# Patient Record
Sex: FEMALE | Race: WHITE | URBAN - METROPOLITAN AREA
[De-identification: names, ages, dates, MRNs, and addresses within clinical notes are randomized per-mention and may not be internally consistent; named-entity substitution may affect disease eponyms.]

---

## 2018-06-12 ENCOUNTER — APPOINTMENT (RX ONLY)
Dept: URBAN - METROPOLITAN AREA CLINIC 31 | Facility: CLINIC | Age: 38
Setting detail: DERMATOLOGY
End: 2018-06-12

## 2018-06-12 DIAGNOSIS — L29.89 OTHER PRURITUS: ICD-10-CM

## 2018-06-12 DIAGNOSIS — L29.8 OTHER PRURITUS: ICD-10-CM

## 2018-06-12 DIAGNOSIS — L30.4 ERYTHEMA INTERTRIGO: ICD-10-CM

## 2018-06-12 DIAGNOSIS — L259 CONTACT DERMATITIS AND OTHER ECZEMA, UNSPECIFIED CAUSE: ICD-10-CM

## 2018-06-12 PROBLEM — L23.9 ALLERGIC CONTACT DERMATITIS, UNSPECIFIED CAUSE: Status: ACTIVE | Noted: 2018-06-12

## 2018-06-12 PROBLEM — L30.9 DERMATITIS, UNSPECIFIED: Status: ACTIVE | Noted: 2018-06-12

## 2018-06-12 PROCEDURE — 99203 OFFICE O/P NEW LOW 30 MIN: CPT | Mod: 25

## 2018-06-12 PROCEDURE — ? BIOPSY BY PUNCH METHOD

## 2018-06-12 PROCEDURE — ? PRESCRIPTION

## 2018-06-12 PROCEDURE — 11100: CPT

## 2018-06-12 PROCEDURE — ? COUNSELING

## 2018-06-12 RX ORDER — KETOCONAZOLE 20 MG/G
CREAM TOPICAL
Qty: 1 | Refills: 2 | Status: ERX | COMMUNITY
Start: 2018-06-12

## 2018-06-12 RX ORDER — PREDNISONE 10 MG/1
TABLET ORAL
Qty: 60 | Refills: 0 | Status: ERX | COMMUNITY
Start: 2018-06-12

## 2018-06-12 RX ORDER — TRIAMCINOLONE ACETONIDE 1 MG/G
CREAM TOPICAL
Qty: 1 | Refills: 0 | Status: ERX | COMMUNITY
Start: 2018-06-12

## 2018-06-12 RX ADMIN — KETOCONAZOLE: 20 CREAM TOPICAL at 18:11

## 2018-06-12 RX ADMIN — TRIAMCINOLONE ACETONIDE: 1 CREAM TOPICAL at 18:24

## 2018-06-12 RX ADMIN — PREDNISONE: 10 TABLET ORAL at 18:17

## 2018-06-12 ASSESSMENT — LOCATION ZONE DERM
LOCATION ZONE: AXILLAE
LOCATION ZONE: ARM

## 2018-06-12 ASSESSMENT — LOCATION DETAILED DESCRIPTION DERM
LOCATION DETAILED: LEFT AXILLARY VAULT
LOCATION DETAILED: RIGHT ANTERIOR PROXIMAL UPPER ARM

## 2018-06-12 ASSESSMENT — LOCATION SIMPLE DESCRIPTION DERM
LOCATION SIMPLE: RIGHT UPPER ARM
LOCATION SIMPLE: LEFT AXILLARY VAULT

## 2018-06-12 NOTE — PROCEDURE: BIOPSY BY PUNCH METHOD
Lab: -20
Dressing: bandage
Additional Anesthesia Volume In Cc (Will Not Render If 0): 0
Home Suture Removal Text: If they have any questions or difficulties they will call the office.
Bill 11263 For Specimen Handling/Conveyance To Laboratory?: no
Wound Care: Petrolatum
Suture Removal: 14 days
Notification Instructions: Patient will be notified of biopsy results if further treatment is necessary. However, patient instructed to call the office if not contacted within 2 weeks.
Hemostasis: Aluminum Chloride
Biopsy Type: H and E
Epidermal Sutures: 4-0 Nylon
Punch Size In Mm: 4
Was A Bandage Applied: Yes
Lab Facility: 3
Detail Level: Detailed
Anesthesia Volume In Cc (Will Not Render If 0): 0.5
Anesthesia Type: 1% lidocaine with epinephrine
Billing Type: Third-Party Bill
Consent: Written consent was obtained and risks were reviewed including but not limited to scarring, infection, bleeding, scabbing, incomplete removal, nerve damage and allergy to anesthesia.
Post-Care Instructions: I reviewed with the patient in detail post-care instructions. Patient is to keep the biopsy covered and then apply petrolatum twice daily until healed.

## 2018-06-12 NOTE — HPI: RASH
How Severe Is Your Rash?: moderate
Is This A New Presentation, Or A Follow-Up?: Rash
Additional History: Patient was prescribed Doxycycline for an upper respiratory infection. Patient broke out in a rash within a day of taking the antibiotic. PCP believes this may be an allergic reaction or a yeast infection.

## 2022-12-16 ENCOUNTER — TELEPHONE (OUTPATIENT)
Dept: PSYCHIATRY | Facility: CLINIC | Age: 42
End: 2022-12-16
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
## 2022-12-28 ENCOUNTER — TELEMEDICINE (OUTPATIENT)
Dept: PSYCHIATRY | Facility: CLINIC | Age: 42
End: 2022-12-28

## 2022-12-28 DIAGNOSIS — F43.21 ADJUSTMENT DISORDER WITH DEPRESSED MOOD: Primary | ICD-10-CM
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
## 2023-01-05 ENCOUNTER — TELEMEDICINE (OUTPATIENT)
Dept: PSYCHIATRY | Facility: CLINIC | Age: 43
End: 2023-01-05

## 2023-01-05 DIAGNOSIS — F43.21 ADJUSTMENT DISORDER WITH DEPRESSED MOOD: Primary | ICD-10-CM

## 2023-01-05 NOTE — PSYCH
Virtual Regular Visit    Verification of patient location:    Patient is located in the following state in which I hold an active license [place]      Assessment/Plan:    Problem List Items Addressed This Visit    None      Goals addressed in session: Goal 1          Reason for visit is No chief complaint on file  Encounter provider Luma Medina    Provider located at [address] [place] [address] Benny Eaton [facility] [id number] [phone]      Recent Visits  No visits were found meeting these conditions  Showing recent visits within past 7 days and meeting all other requirements  Today's Visits  Date Type Provider Dept   01/05/23 Telemedicine [facility] [place] [place] [facility]   Showing today's visits and meeting all other requirements  Future Appointments  No visits were found meeting these conditions  Showing future appointments within next 150 days and meeting all other requirements       The patient was identified by name and date of birth  Gini Luo was informed that this is a telemedicine visit and that the visit is being conducted throughthe [employer] Navigation platform  She agrees to proceed     My office door was closed  No one else was in the room  She acknowledged consent and understanding of privacy and security of the video platform  The patient has agreed to participate and understands they can discontinue the visit at any time  Patient is aware this is a billable service  Subjective  Gini Luo is a 40 y o  female Ashley Frankel attended a Individual  session today  Ashley Frankel entered session with Sad mood and mood-congruent affect  Ashley Frankel expresses sadness as she has come to the realization that her current relationship has been codependent and it will not be what she would like it to be   Client has broken up with this person and is continuing through with the divorce with her --identfying guilt as a constant in regard to his depression and her feeling responsible for it  Writer provided feedback , explored emotions and processed and provided opportunity to reflect on decisions  Writer utilized an eclectic therapeutic approach including interpersonal therapy, expressive therapy and person-centered  Clinician explored client's attachment, discussing the need to heal from this loss of her relationship  Clinician explained the difference of avoidance and distraction  A: Minnie Hsu presented as cooperative, calm  Deabety Drabyer appears to be in the Preparation stage of change  Deabety Hsu has Fair insight  Skye's speech was appropriate quality, quantity and organization of sentences  Skye appeared appropriate  P: Minnie Hsu will follow up with Ashley Hamilton LCSW  in 1 week  In the interim, Minnie Hsu will be mindful of any need for boundaries  Goal(s) 1 was/were addressed in session  Mental Pain:  Very Mild    Skye denies SI, SH, HI at this time and can contract for safety  Behavioral Health Treatment Plan [facility]: Diagnosis and Treatment Plan explained to Yolisrl Wilnerer  Minnie Darbyer relates understanding diagnosis and is agreeable to Treatment Plan  Yes      HPI     No past medical history on file  No past surgical history on file  No current outpatient medications on file  No current facility-administered medications for this visit  Not on File    Review of Systems    Video Exam    There were no vitals filed for this visit      Physical Exam     Visit Time    01/05/23  Start Time: 1104  Stop Time: 8880  Total Visit Time: 48 minutes

## 2023-01-05 NOTE — BH TREATMENT PLAN
Brody Aliciarula  1980       Date of Initial Treatment Plan: 1/5/23   Date of Current Treatment Plan: 01/06/23    Treatment Plan Number 1     Strengths/Personal Resources for Self Care: Client tries to be self aware  "I am reliable and strong willed  I am determined "      Diagnosis:   1  Adjustment disorder with depressed mood            Area of Needs: Address guilt regarding divorce--- with adela and children's transition; client also identifies an anxious attachment style and wants to make sure to become involved in a secure attachment  Long Term Goal 1: Gerson wants to make peace with the divorce  Target Date: 6/5/23  Completion Date: 1/5/24         Short Term Objectives for Goal 1: ACldar will recognize at least 3 differences within her single mother home; Client will develop a routine to adapt to the loss of a  and a gain of a coparent; Client will go through the 5 stages of grief-- identifying these and processing each stage; client will allow herself to feel her feelings 5/5x; Client will develop at least 5 coping mechanisms; client will learn the difference between avoidance/distraction; Client will acknowledge what is true, what she has control over and accept what she does not; Client will identify at least 5 gains from the divorce and will identify at least 3dreams and goals for the future   Client will identify and establish 5 boundaries for her ex     Long Term Goal 2: Client will develop a secure attachment style    Target Date: 6/6/2023  Completion Date: 1/5/2024    Short Term Objectives for Goal 2: Gerson will learn the attachment styles; client will identify her attachment style and 3 ways it impacts her relationships; clien will exercise the opposite of her insecure behaviors 5/5x; client will increase her emotional awaareness--identifying and tolerating her emotions 5/5x and showing empathy toward her partner; Client will practice vulnerability, open communication, and listening; client will seek out healthy relationships--identifying at least 5 values of her own  Client will learn to identify her emotional/physical needs-- and will practice expressing these verbally and physically  ; Client will process her fear of being alone-- client will identify what brings her comfort and peace; Client will identify at least 2 places that bring her peace, identifying at least 3 hobbies that she can do independently at least once a week; client will engage in self care at least once a week; Client  will practice placing her needs as a priority at least once a week; Long Term Goal # 3: N/A     Target Date: N/A  Completion Date: N/A    Short Term Objectives for Goal 3: N/A    GOAL 1: Modality: Individual 4x per month   Completion Date 6/5/2023    GOAL 2: Modality: Individual 4x per month   Completion Date 6/5/2023     GOAL 3: Modality: Individual 4x per month   Completion Date 6/5/23      Behavioral Health Treatment Plan [facility]: Diagnosis and Treatment Plan explained to Elsa Donovan relates understanding diagnosis and is agreeable to Treatment Plan            Client Comments : Please share your thoughts, feelings, need and/or experiences regarding your treatment plan: Client feels that this is a good starting place

## 2023-01-12 ENCOUNTER — TELEMEDICINE (OUTPATIENT)
Dept: PSYCHIATRY | Facility: CLINIC | Age: 43
End: 2023-01-12

## 2023-01-12 DIAGNOSIS — F43.21 ADJUSTMENT DISORDER WITH DEPRESSED MOOD: Primary | ICD-10-CM

## 2023-01-13 PROBLEM — F43.21 ADJUSTMENT DISORDER WITH DEPRESSED MOOD: Status: ACTIVE | Noted: 2023-01-13

## 2023-01-13 NOTE — PSYCH
Virtual Regular Visit    Verification of patient location:    Patient is located in the following state in which I hold an active license [place]      Assessment/Plan:    Problem List Items Addressed This Visit    None      Goals addressed in session: Goal 1          Reason for visit is No chief complaint on file  Encounter provider Luma Hope    Provider located at [address] [place] [id number]  [phone]      Recent Visits  Date Type Provider Dept   01/05/23 Telemedicine [facility] [place] [place] [facility]   Showing recent visits within past 7 days and meeting all other requirements  Today's Visits  Date Type Provider Dept   01/12/23 Telemedicine [facility] [place] [place] [facility]   Showing today's visits and meeting all other requirements  Future Appointments  No visits were found meeting these conditions  Showing future appointments within next 150 days and meeting all other requirements       The patient was identified by name and date of birth  Mehul Ramirez was informed that this is a telemedicine visit and that the visit is being conducted throughthe [employer] platform  She agrees to proceed     My office door was closed  No one else was in the room  She acknowledged consent and understanding of privacy and security of the video platform  The patient has agreed to participate and understands they can discontinue the visit at any time  Patient is aware this is a billable service  Subjective  Mehul Ramirez is a 40 y o  female Fabio Clay attended a Individual  session today  Fabio Clay entered session with Angry mood and mood-congruent affect  Fabio Clay expresses feelings of anger, resentment, and guilt as she recognizes that she has the most of custody for her children   Client contacted her mother and her mother described her as being selfish and advised her to put her children first  Client is now doubting if she is considering her children or if she is being selfish for wanting a break  Writer provided empathic responses, explored emotions and processed, initiated grief discussion, provided opportunity to reflect on decisions and explored cognitive schemas  Writer utilized an eclectic therapeutic approach including expressive therapy, person-centered and psycho-ed  Clinician discussed the grief stages and acknowledged the stages as she moves through them and accepts a new way of living  Clinician and client identified a trigger decision she had made when she initially filed for divorce and how she could transition and give him more responsibility in the future  Clinician and client assessed client's needs and clinician educated client on the results of a lack of sleep and the risk of care giver burn out-- equating her needs to her children's needs  Clinician assisted client in recognizing which emotions she takes on from her support system and which emotions are actually hers  A: Susie Taylor presented as cooperative, calm  Susie Taylor appears to be in the Action stage of change  Susie Taylor has Improving insight  Skye's speech was appropriate quality, quantity and organization of sentences  Skye appeared appropriate  P: Susie Taylor will follow up with Aslhey Hamilton LCSW  in 1 week  In the interim, Susie Taylor will practice validating her emotions as she is processing this transition  Goal(s) 1 was/were addressed in session  Mental Pain:  Very Mild    Skye denies SI, SH, HI at this time and can contract for safety  Behavioral Health Treatment Plan [facility]: Diagnosis and Treatment Plan explained to Susie Richards Caro relates understanding diagnosis and is agreeable to Treatment Plan  Yes      HPI     No past medical history on file  No past surgical history on file  No current outpatient medications on file       No current facility-administered medications for this visit  Not on File    Review of Systems    Video Exam    There were no vitals filed for this visit      Physical Exam     Visit Time    01/12/23  Start Time: 0236  Stop Time: 2085  Total Visit Time: 49 minutes

## 2023-01-19 ENCOUNTER — TELEMEDICINE (OUTPATIENT)
Dept: PSYCHIATRY | Facility: CLINIC | Age: 43
End: 2023-01-19

## 2023-01-19 DIAGNOSIS — F43.21 ADJUSTMENT DISORDER WITH DEPRESSED MOOD: Primary | ICD-10-CM
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
## 2023-01-25 ENCOUNTER — TELEMEDICINE (OUTPATIENT)
Dept: PSYCHIATRY | Facility: CLINIC | Age: 43
End: 2023-01-25

## 2023-01-25 DIAGNOSIS — F43.21 ADJUSTMENT DISORDER WITH DEPRESSED MOOD: Primary | ICD-10-CM

## 2023-01-26 NOTE — PSYCH
Virtual Regular Visit    Verification of patient location:    Patient is located in the following state in which I hold an active license [place]      Assessment/Plan:    Problem List Items Addressed This Visit        Psychiatry Problems    Adjustment disorder with depressed mood - Primary       Goals addressed in session: Goal 1          Reason for visit is No chief complaint on file  Encounter provider Luma Hooper    Provider located at [address] [address]  [phone]      Recent Visits  Date Type Provider Dept   01/25/23 Telemedicine [address] [place] [address] [facility]   01/19/23 Telemedicine [place] [place] [facility]   Showing recent visits within past 7 days and meeting all other requirements  Future Appointments  No visits were found meeting these conditions  Showing future appointments within next 150 days and meeting all other requirements       The patient was identified by name and date of birth  Phil Lemus was informed that this is a telemedicine visit and that the visit is being conducted throughthe [employer] platform  She agrees to proceed     My office door was closed  No one else was in the room  She acknowledged consent and understanding of privacy and security of the video platform  The patient has agreed to participate and understands they can discontinue the visit at any time  Patient is aware this is a billable service  Subjective  Phil Lemus is a 40 y o  female Behavioral Health Psychotherapy Progress Note    Psychotherapy Provided: Individual Psychotherapy     1  Adjustment disorder with depressed mood            Goals addressed in session: Goal 1     DATA: Clinician explored client's thoughts a feelings regarding this past week   Clinician discussed the stages of grief-- how depression can sneak in and take over one day, and feel fine the next  Clinician normalized the difficulty of adapting to being a single mother and assisted client in recognizing the new opportunities available with her new found time during the week  Clinician asserted the fact that client is responsible for her children's basic need  During this session, this clinician used the following therapeutic modalities: Bereavement Therapy, Client-centered Therapy and Supportive Psychotherapy    Substance Abuse was not addressed during this session  If the client is diagnosed with a co-occurring substance use disorder, please indicate any changes in the frequency or amount of use: NA  Stage of change for addressing substance use diagnoses: No substance use/Not applicable    ASSESSMENT:  Laura Chand presents with a Depressed mood  her affect is Normal range and intensity and Tearful, which is congruent, with her mood and the content of the session  The client has made progress on their goals  Client reports that she has been setting boundaries but has been having trouble with being a lone  Client identifies the fear of being alone and feels that there is a lot of free time that she has--and is worried about fighting the feeling to lay down and do nothing  Larua Chand presents with a none risk of suicide, none risk of self-harm, and none risk of harm to others  For any risk assessment that surpasses a "low" rating, a safety plan must be developed  A safety plan was indicated: no  If yes, describe in detail NA    PLAN: Between sessions, Laura Chand will develop a routine day to day including self care, time with her children, work, chores, and socialization  At the next session, the therapist will use Bereavement Therapy, Client-centered Therapy, Solution-Focused Therapy and Supportive Psychotherapy to address client's ongoing difficulties with the divorce       Behavioral Health Treatment Plan and Discharge Planning: Laura Chand is aware of and agrees to continue to work on their treatment plan  They have identified and are working toward their discharge goals  yes    Visit start and stop times:    01/25/23  Start Time: 0903  Stop Time: 3862  Total Visit Time: 50 minutes   HPI     No past medical history on file  No past surgical history on file  No current outpatient medications on file  No current facility-administered medications for this visit  Not on File    Review of Systems    Video Exam    There were no vitals filed for this visit      Physical Exam

## 2023-02-02 ENCOUNTER — TELEMEDICINE (OUTPATIENT)
Dept: PSYCHIATRY | Facility: CLINIC | Age: 43
End: 2023-02-02

## 2023-02-02 DIAGNOSIS — F43.21 ADJUSTMENT DISORDER WITH DEPRESSED MOOD: Primary | ICD-10-CM

## 2023-02-03 NOTE — PSYCH
Virtual Regular Visit    Verification of patient location:    Patient is located in the following state in which I hold an active license [place]      Assessment/Plan:    Problem List Items Addressed This Visit        Psychiatry Problems    Adjustment disorder with depressed mood - Primary       Goals addressed in session: Goal 2          Reason for visit is No chief complaint on file  Encounter provider Luma Armstrong    Provider located at [address] [id number] [phone]      Recent Visits  Date Type Provider Dept   02/02/23 Telemedicine [place] [place] [facility]   Showing recent visits within past 7 days and meeting all other requirements  Future Appointments  No visits were found meeting these conditions  Showing future appointments within next 150 days and meeting all other requirements       The patient was identified by name and date of birth  Memo Looney was informed that this is a telemedicine visit and that the visit is being conducted throughthe [employer] platform  She agrees to proceed     My office door was closed  No one else was in the room  She acknowledged consent and understanding of privacy and security of the video platform  The patient has agreed to participate and understands they can discontinue the visit at any time  Patient is aware this is a billable service  Subjective  Memo Looney is a 40 y o  female Behavioral Health Psychotherapy Progress Note    Psychotherapy Provided: Individual Psychotherapy     1  Adjustment disorder with depressed mood            Goals addressed in session: Goal 2     DATA: Clinician discussed  Client's current stressors  Clinician explored client's relationship and educated client on red flags and attachment styles   Clinician advised client to be mindful of the addictive nature of texting someone continuously all day  During this session, this clinician used the following therapeutic modalities: Client-centered Therapy and Supportive Psychotherapy    Substance Abuse was not addressed during this session  If the client is diagnosed with a co-occurring substance use disorder, please indicate any changes in the frequency or amount of use: NA  Stage of change for addressing substance use diagnoses: No substance use/Not applicable    ASSESSMENT:  Jordyn Rodriges presents with a Euthymic/ normal mood  her affect is Normal range and intensity, which is congruent, with her mood and the content of the session  The client has made progress on their goals  Client reports starting a relationship-- talking to someone all day long and feeling as if the relationship is moving too quickly  Jordyn Rodriges presents with a none risk of suicide, none risk of self-harm, and none risk of harm to others  For any risk assessment that surpasses a "low" rating, a safety plan must be developed  A safety plan was indicated: no  If yes, describe in detail NA    PLAN: Between sessions, Jordyn Rodriges will be mindful of red flags, and create boundaries for her newly budding relationship  At the next session, the therapist will use Client-centered Therapy and Supportive Psychotherapy to address client's on going stressors  Behavioral Health Treatment Plan and Discharge Planning: Jordyn Rodriges is aware of and agrees to continue to work on their treatment plan  They have identified and are working toward their discharge goals  yes    Visit start and stop times:    02/02/23  Start Time: 1206  Stop Time: 1253  Total Visit Time: 47 minutes   HPI     No past medical history on file  No past surgical history on file  No current outpatient medications on file  No current facility-administered medications for this visit          Not on File    Review of Systems    Video Exam    There were no vitals filed for this visit     Physical Exam

## 2023-02-09 ENCOUNTER — TELEMEDICINE (OUTPATIENT)
Dept: PSYCHIATRY | Facility: CLINIC | Age: 43
End: 2023-02-09

## 2023-02-09 DIAGNOSIS — F43.21 ADJUSTMENT DISORDER WITH DEPRESSED MOOD: Primary | ICD-10-CM
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
## 2023-02-15 ENCOUNTER — TELEMEDICINE (OUTPATIENT)
Dept: PSYCHIATRY | Facility: CLINIC | Age: 43
End: 2023-02-15

## 2023-02-15 DIAGNOSIS — F43.21 ADJUSTMENT DISORDER WITH DEPRESSED MOOD: Primary | ICD-10-CM
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
## 2023-02-24 ENCOUNTER — TELEMEDICINE (OUTPATIENT)
Dept: PSYCHIATRY | Facility: CLINIC | Age: 43
End: 2023-02-24

## 2023-02-24 DIAGNOSIS — F43.21 ADJUSTMENT DISORDER WITH DEPRESSED MOOD: Primary | ICD-10-CM

## 2023-02-27 NOTE — PSYCH
Virtual Regular Visit    Verification of patient location:    Patient is located in the following state in which I hold an active license [place]      Assessment/Plan:    Problem List Items Addressed This Visit        Psychiatry Problems    Adjustment disorder with depressed mood - Primary       Goals addressed in session: Goal 1          Reason for visit is No chief complaint on file  Encounter provider Luma Rojas    Provider located at [address] [place] [address] [place]  [facility] [id number] [phone]      Recent Visits  Date Type Provider Dept   02/24/23 Telemedicine [place] [place] [facility]   Showing recent visits within past 7 days and meeting all other requirements  Future Appointments  No visits were found meeting these conditions  Showing future appointments within next 150 days and meeting all other requirements       The patient was identified by name and date of birth  Tawanda Ace was informed that this is a telemedicine visit and that the [facility]  She agrees to proceed     My office door was closed  No one else was in the room  She acknowledged consent and understanding of privacy and security of the video platform  The patient has agreed to participate and understands they can discontinue the visit at any time  Patient is aware this is a billable service  Subjective  Tawanda Ace is a 40 y o  female Behavioral Health Psychotherapy Progress Note    Psychotherapy Provided: Individual Psychotherapy     1  Adjustment disorder with depressed mood            Goals addressed in session: Goal 1     DATA: Clinician explored client's recent decisions regarding her divorce agreement  Clinician reassured client of her actions as it was obvious she thought them through   Clinician encouraged client to continue her 4 square breathing every morning  During this session, this clinician used the following therapeutic modalities: Client-centered Therapy, Solution-Focused Therapy and Supportive Psychotherapy    Substance Abuse was not addressed during this session  If the client is diagnosed with a co-occurring substance use disorder, please indicate any changes in the frequency or amount of use: NA  Stage of change for addressing substance use diagnoses: No substance use/Not applicable    ASSESSMENT:  Aisha Buchanan presents with a Anxious mood  her affect is Normal range and intensity, which is congruent, with her mood and the content of the session  The client has made progress on their goals  Client reports that she is signing her divorce agreement today in public with her partner  Aisha Buchanan presents with a none risk of suicide, none risk of self-harm, and none risk of harm to others  For any risk assessment that surpasses a "low" rating, a safety plan must be developed  A safety plan was indicated: no  If yes, describe in detail NA    PLAN: Between sessions, Aisha Buchanan will finalize her divorce agreement    At the next session, the therapist will use Client-centered Therapy and Supportive Psychotherapy to address client's ongoing stressors  Behavioral Health Treatment Plan and Discharge Planning: Aisha Buchanan is aware of and agrees to continue to work on their treatment plan  They have identified and are working toward their discharge goals  yes    Visit start and stop times:    02/24/23  Start Time: 1104  Stop Time: 1156  Total Visit Time: 52 minutes   HPI     No past medical history on file  No past surgical history on file  No current outpatient medications on file  No current facility-administered medications for this visit  Not on File    Review of Systems    Video Exam    There were no vitals filed for this visit      Physical Exam

## 2023-03-06 ENCOUNTER — TELEMEDICINE (OUTPATIENT)
Dept: PSYCHIATRY | Facility: CLINIC | Age: 43
End: 2023-03-06

## 2023-03-06 DIAGNOSIS — F43.21 ADJUSTMENT DISORDER WITH DEPRESSED MOOD: Primary | ICD-10-CM
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
## 2023-03-14 ENCOUNTER — TELEMEDICINE (OUTPATIENT)
Dept: PSYCHIATRY | Facility: CLINIC | Age: 43
End: 2023-03-14

## 2023-03-14 DIAGNOSIS — F41.1 GENERALIZED ANXIETY DISORDER: Primary | ICD-10-CM
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
## 2023-03-20 ENCOUNTER — TELEMEDICINE (OUTPATIENT)
Dept: PSYCHIATRY | Facility: CLINIC | Age: 43
End: 2023-03-20

## 2023-03-20 DIAGNOSIS — F41.1 GENERALIZED ANXIETY DISORDER: Primary | ICD-10-CM

## 2023-03-20 NOTE — PSYCH
Virtual Regular Visit    Verification of patient location:    Patient is located in the following state in which I hold an [place] license [place]      Assessment/Plan:    Problem List Items Addressed This Visit    None  Visit Diagnoses     Generalized anxiety disorder    -  Primary          Goals addressed in session: Goal 2          Reason for visit is No chief complaint on file  Encounter provider Luma Walsh    Provider located at [address] [place] [address] Benny [place]  [facility] [address]  [phone]      Recent Visits  Date Type Provider Dept   03/14/23 [facility]   Showing recent visits within past 7 days and meeting all other requirements  Today's Visits  Date Type Provider Dept   03/20/23 Telemedicine [place] [place] [facility]   Showing today's visits and meeting all other requirements  Future Appointments  No visits were found meeting these conditions  Showing future appointments within next 150 days and meeting all other requirements       The patient was identified by name and date of birth  Mustapha Dickey was informed that this is a telemedicine visit and that the visit is being conducted throughthe [employer] platform  She agrees to proceed     My office door was closed  No one else was in the room  She acknowledged consent and understanding of privacy and security of the video platform  The patient has agreed to participate and understands they can discontinue the visit at any time  Patient is aware this is a billable service  Subjective  Mustapha Dickey is a 40 y o  female Behavioral Health Psychotherapy Progress Note    Psychotherapy Provided: Individual Psychotherapy     1   Generalized anxiety disorder            Goals addressed in session: Goal 2     DATA: Clinician assisted client in identifying boundaries that need to be implemented  Clinician and client discussed values that may not match up with her current partner  Clinician encouraged client to request her physical and emotional needs from her partner  During this session, this clinician used the following therapeutic modalities: Client-centered Therapy, Cognitive Behavioral Therapy, Solution-Focused Therapy and Supportive Psychotherapy    Substance Abuse was not addressed during this session  If the client is diagnosed with a co-occurring substance use disorder, please indicate any changes in the frequency or amount of use: NA  Stage of change for addressing substance use diagnoses: No substance use/Not applicable    ASSESSMENT:  Kalie Rocha presents with a Euthymic/ normal mood  her affect is Normal range and intensity, which is congruent, with her mood and the content of the session  The client has made progress on their goals  Client reports that she is struggling in her relationship and thinking of a previous connection she had Kalie Rocha presents with a none risk of suicide, none risk of self-harm, and none risk of harm to others  For any risk assessment that surpasses a "low" rating, a safety plan must be developed  A safety plan was indicated: no  If yes, describe in detail NA    PLAN: Between sessions, Kalie Rocha will continue to advocate for her needs  At the next session, the therapist will use Client-centered Therapy to address ongoing stressors  Behavioral Health Treatment Plan and Discharge Planning: Kalie Rocha is aware of and agrees to continue to work on their treatment plan  They have identified and are working toward their discharge goals  yes    Visit start and stop times:    03/20/23  Start Time: 1007  Stop Time: 1056  Total Visit Time: 49 minutes   HPI     No past medical history on file  No past surgical history on file  No current outpatient medications on file       No current facility-administered medications for this visit  Not on File    Review of Systems    Video Exam    There were no vitals filed for this visit      Physical Exam

## 2023-03-28 ENCOUNTER — TELEMEDICINE (OUTPATIENT)
Dept: PSYCHIATRY | Facility: CLINIC | Age: 43
End: 2023-03-28

## 2023-03-28 DIAGNOSIS — F41.1 GENERALIZED ANXIETY DISORDER: Primary | ICD-10-CM

## 2023-03-29 ENCOUNTER — TELEMEDICINE (OUTPATIENT)
Dept: PSYCHIATRY | Facility: CLINIC | Age: 43
End: 2023-03-29

## 2023-03-29 DIAGNOSIS — F41.1 GENERALIZED ANXIETY DISORDER: Primary | ICD-10-CM
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
## 2023-03-30 NOTE — PSYCH
Virtual Regular Visit    Verification of patient location:    Patient is located in the following state in which I hold an active license [place]      Assessment/Plan:    Problem List Items Addressed This Visit        Psychiatry Problems    Generalized anxiety disorder - Primary       Goals addressed in session: Goal 1          Reason for visit is No chief complaint on file  Encounter provider Luma Villagran    Provider located at [address] [place] [id number]  [phone]      Recent Visits  Date Type Provider Dept   03/29/23 Telemedicine [facility] [address] [facility]   03/28/23 Telemedicine [facility]   Showing recent visits within past 7 days and meeting all other requirements  Future Appointments  No visits were found meeting these conditions  Showing future appointments within next 150 days and meeting all other requirements       The patient was identified by name and date of birth  Tiffanie Mcneal was informed that this is a telemedicine visit and that the visit is being conducted throughthe [employer] platform  She agrees to proceed     My office door was closed  No one else was in the room  She acknowledged consent and understanding of privacy and security of the video platform  The patient has agreed to participate and understands they can discontinue the visit at any time  Patient is aware this is a billable service  Subjective  Tiffanie Mcneal is a 40 y o  female Behavioral Health Psychotherapy Progress Note    Psychotherapy Provided: Individual Psychotherapy     1  Generalized anxiety disorder            Goals addressed in session: Goal 1     DATA: Clinician provided client with emotional support-- empathy and active listening   Clinician assisted client in recognizing what was in and out of her control in regard to "her ex's suicide  Clinician reassured and affirmed client's decision to get a divorce and begin dating  Clinician advised client ways that she can involve him within her home and her children's lives  During this session, this clinician used the following therapeutic modalities: Bereavement Therapy, Client-centered Therapy and Supportive Psychotherapy    Substance Abuse was not addressed during this session  If the client is diagnosed with a co-occurring substance use disorder, please indicate any changes in the frequency or amount of use: NA  Stage of change for addressing substance use diagnoses: No substance use/Not applicable    ASSESSMENT:  Alec Carmona presents with a Euthymic/ normal, Anxious and Dysthymic mood  her affect is Tearful, which is congruent, with her mood and the content of the session  The client has made progress on their goals  Client reports struggling with guilt for the divorce and moving on  Alec Carmona presents with a none risk of suicide, none risk of self-harm, and none risk of harm to others  For any risk assessment that surpasses a \"low\" rating, a safety plan must be developed  A safety plan was indicated: no  If yes, describe in detail NA    PLAN: Between sessions, Alec Carmona will spend time with her family and talk about her emotions with her support system  At the next session, the therapist will use Bereavement Therapy to address ongoing stressors  Behavioral Health Treatment Plan and Discharge Planning: Alec Carmona is aware of and agrees to continue to work on their treatment plan  They have identified and are working toward their discharge goals  yes    Visit start and stop times:    03/29/23  Start Time: 1505  Stop Time: 1557  Total Visit Time: 52 minutes   HPI     No past medical history on file  No past surgical history on file  No current outpatient medications on file  No current facility-administered medications for this visit        " Not on File    Review of Systems    Video Exam    There were no vitals filed for this visit      Physical Exam

## 2023-04-03 ENCOUNTER — TELEMEDICINE (OUTPATIENT)
Dept: PSYCHIATRY | Facility: CLINIC | Age: 43
End: 2023-04-03

## 2023-04-03 DIAGNOSIS — F41.1 GENERALIZED ANXIETY DISORDER: Primary | ICD-10-CM

## 2023-04-03 NOTE — PSYCH
Virtual Regular Visit    Verification of patient location:    Patient is located in the following state in which I hold an active license [place]      Assessment/Plan:    Problem List Items Addressed This Visit        Psychiatry Problems    Generalized anxiety disorder - Primary       Goals addressed in session: Goal 1          Reason for visit is No chief complaint on file  Encounter provider Luma Rodriguez    Provider located at [address] [address]  [phone]      Recent Visits  Date Type Provider Dept   23 Telemedicine [address] [place] [address] [facility]   23 Telemedicine [place] [place], [place] [facility]   Showing recent visits within past 7 days and meeting all other requirements  Today's Visits  Date Type Provider Dept   23 Telemedicine [facility] [place], [place] [facility]   Showing today's visits and meeting all other requirements  Future Appointments  No visits were found meeting these conditions  Showing future appointments within next 150 days and meeting all other requirements       The patient was identified by name and date of birth  Jesus Kendall was informed that this is a telemedicine visit and that the visit is being conducted throughthe [employer] platform  She agrees to proceed     My office door was closed  No one else was in the room  She acknowledged consent and understanding of privacy and security of the video platform  The patient has agreed to participate and understands they can discontinue the visit at any time  Patient is aware this is a billable service  Subjective  Jesus Kendall is a 40 y o  female Behavioral Health Psychotherapy Progress Note    Psychotherapy Provided: Individual Psychotherapy     1   Generalized anxiety disorder            Goals addressed in session: Goal "1     DATA: Clinician explored client's children's grieving process--- giving insight and encouraging client to ask questions and comfort  Clinician affirmed client's need to take some time off  During this session, this clinician used the following therapeutic modalities: Bereavement Therapy    Substance Abuse was not addressed during this session  If the client is diagnosed with a co-occurring substance use disorder, please indicate any changes in the frequency or amount of use: NA  Stage of change for addressing substance use diagnoses: No substance use/Not applicable    ASSESSMENT:  Teddy Wilson presents with a Euthymic/ normal mood  her affect is Normal range and intensity, which is congruent, with her mood and the content of the session  The client has made progress on their goals  Teddy Wilson presents with a none risk of suicide, none risk of self-harm, and none risk of harm to others  For any risk assessment that surpasses a \"low\" rating, a safety plan must be developed  A safety plan was indicated: no  If yes, describe in detail NA    PLAN: Between sessions, Teddy Wilson will continue to utilize her support system  At the next session, the therapist will use Bereavement Therapy to address ongoing grief  Client will see clinician two times a week during this time due to clinical necessity  Behavioral Health Treatment Plan and Discharge Planning: Teddy Wilson is aware of and agrees to continue to work on their treatment plan  They have identified and are working toward their discharge goals  yes    Visit start and stop times:    04/03/23  Start Time: 1504  Stop Time: 1556  Total Visit Time: 52 minutes   HPI     No past medical history on file  No past surgical history on file  No current outpatient medications on file  No current facility-administered medications for this visit          Not on File    Review of Systems    Video Exam    There were no vitals filed for this " visit     Physical Exam

## 2023-04-06 ENCOUNTER — TELEMEDICINE (OUTPATIENT)
Dept: PSYCHIATRY | Facility: CLINIC | Age: 43
End: 2023-04-06

## 2023-04-06 DIAGNOSIS — Z91.199 NO-SHOW FOR APPOINTMENT: Primary | ICD-10-CM

## 2023-04-06 NOTE — PSYCH
No Call  No Show  No Charge    Milena Rider no showed 04/06/23 appointment , staff called and left message to reschedule appointment     Treatment Plan not due at this session

## 2023-04-07 ENCOUNTER — TELEPHONE (OUTPATIENT)
Dept: BEHAVIORAL/MENTAL HEALTH CLINIC | Facility: CLINIC | Age: 43
End: 2023-04-07

## 2023-04-11 PROBLEM — F43.10 PTSD (POST-TRAUMATIC STRESS DISORDER): Status: ACTIVE | Noted: 2023-04-11

## 2023-04-25 ENCOUNTER — TELEMEDICINE (OUTPATIENT)
Dept: PSYCHIATRY | Facility: CLINIC | Age: 43
End: 2023-04-25

## 2023-04-25 DIAGNOSIS — F41.1 GENERALIZED ANXIETY DISORDER: Primary | ICD-10-CM

## 2023-04-25 DIAGNOSIS — F43.10 PTSD (POST-TRAUMATIC STRESS DISORDER): ICD-10-CM

## 2023-04-26 NOTE — PSYCH
Virtual Regular Visit    Verification of patient location:    Patient is located at Home in the following state in which I hold an active license [place]      Assessment/Plan:    Problem List Items Addressed This Visit        Psychiatry Problems    Generalized anxiety disorder - Primary    PTSD (post-traumatic stress disorder)       Goals addressed in session: Goal 1          Reason for visit is No chief complaint on file  Encounter provider Luma Garcia    Provider located at [address] [place] [id number] [phone]      Recent Visits  Date Type Provider Dept   04/25/23 Telemedicine [address] [place] [address] [facility]   04/21/23 Telemedicine [facility]   Showing recent visits within past 7 days and meeting all other requirements  Future Appointments  No visits were found meeting these conditions  Showing future appointments within next 150 days and meeting all other requirements       The patient was identified by name and date of birth  Nina Boland was informed that this is a telemedicine visit and that the visit is being conducted throughthe [employer] platform  She agrees to proceed     My office door was closed  No one else was in the room  She acknowledged consent and understanding of privacy and security of the video platform  The patient has agreed to participate and understands they can discontinue the visit at any time  Patient is aware this is a billable service  Subjective  Nina Boland is a 40 y o  female Behavioral Health Psychotherapy Progress Note    Psychotherapy Provided: Individual Psychotherapy     1  Generalized anxiety disorder        2   PTSD (post-traumatic stress disorder)            Goals addressed in session: Goal 1     DATA: Clinician explored client's current stressors and discussed her cultivated "dependence on her mother  Clinician assisted client in recognizing themes within her relationship and discussed harmful thoughts seeking for reassurance and approval    During this session, this clinician used the following therapeutic modalities: Client-centered Therapy, Solution-Focused Therapy and Supportive Psychotherapy    Substance Abuse was not addressed during this session  If the client is diagnosed with a co-occurring substance use disorder, please indicate any changes in the frequency or amount of use: NA  Stage of change for addressing substance use diagnoses: No substance use/Not applicable    ASSESSMENT:  Nadir Sandy presents with a Anxious mood  her affect is Normal range and intensity, which is congruent, with her mood and the content of the session  The client has made progress on their goals  Nadir Sandy presents with a none risk of suicide, none risk of self-harm, and none risk of harm to others  For any risk assessment that surpasses a \"low\" rating, a safety plan must be developed  A safety plan was indicated: no  If yes, describe in detail NA    PLAN: Between sessions, Nadir Sandy will practice resisting the compulsion to seek out reassurance; and will continue her breathing during panic attacks    At the next session, the therapist will use Client-centered Therapy to address ongoing stressors  Behavioral Health Treatment Plan and Discharge Planning: Nadir Sandy is aware of and agrees to continue to work on their treatment plan  They have identified and are working toward their discharge goals  yes    Visit start and stop times:    04/25/23  Start Time: 1513  Stop Time: 1559  Total Visit Time: 46 minutes   HPI     No past medical history on file  No past surgical history on file  No current outpatient medications on file  No current facility-administered medications for this visit          Not on File    Review of Systems    Video Exam    There were no vitals " filed for this visit      Physical Exam

## 2023-05-01 ENCOUNTER — TELEMEDICINE (OUTPATIENT)
Dept: PSYCHIATRY | Facility: CLINIC | Age: 43
End: 2023-05-01

## 2023-05-01 DIAGNOSIS — F41.1 GENERALIZED ANXIETY DISORDER: ICD-10-CM

## 2023-05-01 DIAGNOSIS — F43.10 PTSD (POST-TRAUMATIC STRESS DISORDER): Primary | ICD-10-CM

## 2023-05-02 NOTE — PSYCH
Virtual Regular Visit    Verification of patient location:    Patient is located at Home in the following state in which I hold an active license [place]      Assessment/Plan:    Problem List Items Addressed This Visit        Psychiatry Problems    Generalized anxiety disorder    PTSD (post-traumatic stress disorder) - Primary       Goals addressed in session: Goal 1          Reason for visit is No chief complaint on file  Encounter provider [place], [place]    Provider located at [address] [place] [id number] [phone]      Recent Visits  Date Type Provider Dept   05/01/23 Telemedicine [address] [place] [address] [facility]   04/25/23 Telemedicine [facility]   Showing recent visits within past 7 days and meeting all other requirements  Future Appointments  No visits were found meeting these conditions  Showing future appointments within next 150 days and meeting all other requirements       The patient was identified by name and date of birth  James Magdaleno was informed that this is a telemedicine visit and that the visit is being conducted throughthe [employer] platform  She agrees to proceed     My office door was closed  No one else was in the room  She acknowledged consent and understanding of privacy and security of the video platform  The patient has agreed to participate and understands they can discontinue the visit at any time  Patient is aware this is a billable service  Subjective  James Magdaleno is a 40 y o  female Behavioral Health Psychotherapy Progress Note    Psychotherapy Provided: Individual Psychotherapy     1  PTSD (post-traumatic stress disorder)        2   Generalized anxiety disorder            Goals addressed in session: Goal 1     DATA: Clinician affirmeddoris's boundaries set and explored her barriers to "giving in  Clinician assisted client in recognizing triggers and relationships of her ex 's that are not her responsibility  Clinician encouraged client to enforce her boundaries  During this session, this clinician used the following therapeutic modalities: Bereavement Therapy, Client-centered Therapy and Supportive Psychotherapy    Substance Abuse was not addressed during this session  If the client is diagnosed with a co-occurring substance use disorder, please indicate any changes in the frequency or amount of use: NA  Stage of change for addressing substance use diagnoses: No substance use/Not applicable    ASSESSMENT:  Shola Ocampo presents with a Euthymic/ normal mood  her affect is Normal range and intensity, which is congruent, with her mood and the content of the session  The client has made progress on their goals  Client reports-- ex had a best friend who is unstable mentally and has attempted to utilize her as an emotional support since her  passed  Shola Ocampo presents with a none risk of suicide, none risk of self-harm, and none risk of harm to others  For any risk assessment that surpasses a \"low\" rating, a safety plan must be developed  A safety plan was indicated: no  If yes, describe in detail NA    PLAN: Between sessions, Shola Ocampo will eliminate her triggering relationships  At the next session, the therapist will use Client-centered Therapy to address ongoing stressors  Behavioral Health Treatment Plan and Discharge Planning: Shola Ocampo is aware of and agrees to continue to work on their treatment plan  They have identified and are working toward their discharge goals  yes    Visit start and stop times:    05/01/23  Start Time: 1513  Stop Time: 1559  Total Visit Time: 46 minutes   HPI     No past medical history on file  No past surgical history on file  No current outpatient medications on file       No current facility-administered medications " for this visit  Not on File    Review of Systems    Video Exam    There were no vitals filed for this visit      Physical Exam

## 2023-05-04 ENCOUNTER — TELEMEDICINE (OUTPATIENT)
Dept: PSYCHIATRY | Facility: CLINIC | Age: 43
End: 2023-05-04

## 2023-05-04 DIAGNOSIS — F43.10 PTSD (POST-TRAUMATIC STRESS DISORDER): ICD-10-CM

## 2023-05-04 DIAGNOSIS — F41.1 GENERALIZED ANXIETY DISORDER: Primary | ICD-10-CM

## 2023-05-04 NOTE — PSYCH
Virtual Regular Visit    Verification of patient location:    Patient is located at Home in the following state in which I [place] an [place] license [place]      Assessment/Plan:    Problem List Items Addressed This Visit        Psychiatry Problems    Generalized anxiety disorder - Primary    PTSD (post-traumatic stress disorder)       Goals addressed in session: Goal 1          Reason for visit is No chief complaint on file  Encounter provider Luma Manning    Provider located at [address] [place] [id number] [phone]      Recent Visits  Date Type Provider Dept   05/01/23 Telemedicine [facility] [place], [place] [facility]   Showing recent visits within past 7 days and meeting all other requirements  Today's Visits  Date Type Provider Dept   05/04/23 Telemedicine [facility] [place] [place] [facility]   Showing today's visits and meeting all other requirements  Future Appointments  No visits were found meeting these conditions  Showing future appointments within next 150 days and meeting all other requirements       The patient was identified by name and date of birth  Janny Jaquez was informed that this is a telemedicine visit and that the visit is being conducted throughthe [employer] platform  She agrees to proceed     My office door was closed  No one else was in the room  She acknowledged consent and understanding of privacy and security of the video platform  The patient has agreed to participate and understands they can discontinue the visit at any time  Patient is aware this is a billable service  Subjective  Janny Jaquez is a 40 y o  female Behavioral Health Psychotherapy Progress Note    Psychotherapy Provided: Individual Psychotherapy     1  Generalized anxiety disorder        2   PTSD (post-traumatic stress disorder)            Goals addressed in "session: Goal 1     DATA: Clinician acknowledged client's progress and assisted her in recognizing the acceptance of her ex 's passing  Clinician explored client's concern regarding her schedule-- going nonstop-- and excessive guilt regarding her performance as a mother  Clinician encouraged client to promote independence for her children  During this session, this clinician used the following therapeutic modalities: Client-centered Therapy, Mindfulness-based Strategies, Solution-Focused Therapy and Supportive Psychotherapy    Substance Abuse was not addressed during this session  If the client is diagnosed with a co-occurring substance use disorder, please indicate any changes in the frequency or amount of use: NA  Stage of change for addressing substance use diagnoses: No substance use/Not applicable    ASSESSMENT:  Effie Aiken presents with a Euthymic/ normal mood  her affect is Normal range and intensity, which is congruent, with her mood and the content of the session  The client has made progress on their goals  Client was approved until May 15th for her leave of absence Effie Aiken presents with a none risk of suicide, none risk of self-harm, and none risk of harm to others  For any risk assessment that surpasses a \"low\" rating, a safety plan must be developed  A safety plan was indicated: no  If yes, describe in detail NA    PLAN: Between sessions, Effie Aiken will decide if she wants to appeal or go back to work  At the next session, the therapist will use Client-centered Therapy to address ongoing stressors  Behavioral Health Treatment Plan and Discharge Planning: Effie Aiken is aware of and agrees to continue to work on their treatment plan  They have identified and are working toward their discharge goals  yes    Visit start and stop times:    05/04/23  Start Time: 3364  Stop Time: 8269  Total Visit Time: 51 minutes   HPI     No past medical history on file      No past " surgical history on file  No current outpatient medications on file  No current facility-administered medications for this visit  Not on File    Review of Systems    Video Exam    There were no vitals filed for this visit      Physical Exam

## 2023-05-09 ENCOUNTER — TELEMEDICINE (OUTPATIENT)
Dept: PSYCHIATRY | Facility: CLINIC | Age: 43
End: 2023-05-09

## 2023-05-09 DIAGNOSIS — F43.10 PTSD (POST-TRAUMATIC STRESS DISORDER): ICD-10-CM

## 2023-05-09 DIAGNOSIS — F41.1 GENERALIZED ANXIETY DISORDER: Primary | ICD-10-CM

## 2023-05-10 NOTE — PSYCH
Virtual Regular Visit    Verification of patient location:    Patient is located at Home in the following state in which [place] an [place] license [place]      Assessment/Plan:    Problem List Items Addressed This Visit        Psychiatry Problems    Generalized anxiety disorder - Primary    PTSD (post-traumatic stress disorder)       Goals addressed in session: Goal 1          Reason for visit is No chief complaint on file  Encounter provider Darin [place], [place]    Provider located at [address] [address] [phone]      Recent Visits  Date Type Provider Dept   05/09/23 [facility]in G[place], [address] [place] [address] [facility]   05/04/23 Telemedicine [facility]   Showing recent visits within past 7 days and meeting all other requirements  Future Appointments  No visits were found meeting these conditions  Showing future appointments within next 150 days and meeting all other requirements       The patient was identified by name and date of birth  Wandy Simmons was informed that this is a telemedicine visit and that the visit is being conducted throughthe [employer] platform  She agrees to proceed     My office door was closed  No one else was in the room  She acknowledged consent and understanding of privacy and security of the video platform  The patient has agreed to participate and understands they can discontinue the visit at any time  Patient is aware this is a billable service  Subjective  Wandy Simmons is a 40 y o  female Behavioral Health Psychotherapy Progress Note    Psychotherapy Provided: Individual Psychotherapy     1  Generalized anxiety disorder        2  PTSD (post-traumatic stress disorder)            Goals addressed in session: Goal 2     DATA: Clinician explored client's anxieties and her avoidance    clinician "educated client on the anxious avoidance cycle and encouraged her to identify the worst case scenarios  Clinician encouraged client to remind herself that it is never as bad as she fears it will be and there is always an alternative solution  During this session, this clinician used the following therapeutic modalities: Client-centered Therapy, Cognitive Behavioral Therapy, Solution-Focused Therapy and Supportive Psychotherapy    Substance Abuse was not addressed during this session  If the client is diagnosed with a co-occurring substance use disorder, please indicate any changes in the frequency or amount of use: NA  Stage of change for addressing substance use diagnoses: No substance use/Not applicable    ASSESSMENT:  Rell Johnson presents with a Euthymic/ normal mood  her affect is Normal range and intensity, which is congruent, with her mood and the content of the session  The client has made progress on their goals  Rell Johnson presents with a none risk of suicide, none risk of self-harm, and none risk of harm to others  For any risk assessment that surpasses a \"low\" rating, a safety plan must be developed  A safety plan was indicated: no  If yes, describe in detail NA    PLAN: Between sessions, Rell Johnson will make a list of all that she is avoiding and will accomplish everything on her list  At the next session, the therapist will use Client-centered Therapy to address ongoing stressors  Behavioral Health Treatment Plan and Discharge Planning: Rell Johnson is aware of and agrees to continue to work on their treatment plan  They have identified and are working toward their discharge goals  yes    Visit start and stop times:    05/9/23  Start Time: 1107  Stop Time: 1158  Total Visit Time: 51 minutes   HPI     No past medical history on file  No past surgical history on file  No current outpatient medications on file       No current facility-administered medications for this " visit  Not on File    Review of Systems    Video Exam    There were no vitals filed for this visit      Physical Exam

## 2023-05-12 ENCOUNTER — TELEMEDICINE (OUTPATIENT)
Dept: PSYCHIATRY | Facility: CLINIC | Age: 43
End: 2023-05-12

## 2023-05-12 DIAGNOSIS — F43.10 PTSD (POST-TRAUMATIC STRESS DISORDER): ICD-10-CM

## 2023-05-12 DIAGNOSIS — F41.1 GENERALIZED ANXIETY DISORDER: Primary | ICD-10-CM

## 2023-05-12 NOTE — BH CRISIS PLAN
"Client Name: Rossana Bustillo       Client YOB: 1980  : 1980    Treatment Team (include name and contact information):     Psychotherapist: Alyson Soto     Psychiatrist: DEE   Release of information completed: no    \" NA   Release of information completed: no    Other (Specify Role): NA    Release of information completed: no    Other (Specify Role): NA   Release of information completed: no    Healthcare Provider  Vanessa Cintron DO  [address] [facility] [address]    Type of Plan   * Child plans (children 15 yo and younger) must be completed and signed by the child's legal guardian   * Plans for all individuals 15 yo and above must be signed by the client  Plan Type: adolescent/adult (14 and over) Initial      My Personal Strengths are (in the client's own words):  Compassionate I think about other people's feelings a lot; I am a good communicator through writing, speaking  The stressors and triggers that may put me at risk are:  other (describe) any conflict in a relationship  Srikanth Haley or when someone is upset with me    if there is something unknown  Sabrina Hinsdale dont have an answer or I am waiting for an answer    something is going on and I dont know what it is        Coping skills I can use to keep myself calm and safe:  Physical activity and Other (describe) Stepping away    breathing, go for a walk    distracting myself    listening to music    reframing my thoughts  Coping skills/supports I can use to maintain abstinence from substance use:   NA    The people that provide me with help and support: (Include name, contact, and how they can help)   Support person #1: Mom-- Tiana Layne    * Phone number: [phone]    * How can they help me? She listens  Srikanth Haley if I am upset about something    she will offer her advice  Srikanthvanessa Haley and sometimes when I dont ask       Support person #2: Evaristo Mendiola     * Phone number: [phone]    * How can they help me? She is encouraging, I can do it    I can get " through it    she says more positive things to me    gives me advice and she listens  Support person #3: Johnathon Rivera-- Friend     * Phone number: [phone]    * How can they help me? He is close    he can do a lot of things to help  Onita Sport and he does    him and his wife    they are a support  Onita Sport if I physically need something, I can rely on them  If I need help with the kids in an emergency, I have somewhere else to go  Older neighbors    we visit them in our old neighborhood  We have stayed close with them     In the past, the following has helped me in times of crisis:    Taking a walk or exercising, Breathing exercises (or other mindfulness-based activities) and Using de-escalation tools that I have learned      If it is an emergency and you need immediate help, call 9-1-1    If there is a possibility of danger to yourself or others, call the following crisis hotline resources:     Adult Crisis Numbers  Suicide Prevention Hotline - Dial 9-8-8  [facility]: Alvina Navarrete 13: R Blaine 56: [address]: [phone]  [facility] ([facility]): [phone]  [facility]: [address] [place] [address] Avenue: [address] [place] [address] St: [phone] (daytime)  [phone] (after hours, weekends, holidays)     Child/Adolescent Crisis Numbers   [facility] WOMEN'S AND CHILDREN'S Lists of hospitals in the [place]: Russell Archuleta 10: [phone]   Daniel Higginbotham: [phone]   [address] ([facility]): [phone]    Please note: Some [facility] do not have a separate number for Child/Adolescent specific crisis  If your county is not listed under Child/Adolescent, please call the adult number for your county     National Talk to Text Line   All Ages - [phone]    In the event your feelings become unmanageable, and you cannot reach your support system, you will call 911 immediately or go to the nearest hospital emergency room

## 2023-05-12 NOTE — PSYCH
Virtual Regular Visit    Verification of patient location:    Patient is located at Home in the following state in which I hold an active license [place]      Assessment/Plan:    Problem List Items Addressed This Visit        Psychiatry Problems    Generalized anxiety disorder - Primary    PTSD (post-traumatic stress disorder)       Goals addressed in session: Goal 2          Reason for visit is No chief complaint on file  Encounter provider Luma Rosenthal    Provider located at [address] [place], [place] [address]  [facility] [address] [phone]      Recent Visits  Date Type Provider Dept   05/09/23 [facility]   Showing recent visits within past 7 days and meeting all other requirements  Today's Visits  Date Type Provider Dept   05/12/23 Telemedicine [place], [place] [facility]   Showing today's visits and meeting all other requirements  Future Appointments  No visits were found meeting these conditions  Showing future appointments within next 150 days and meeting all other requirements       The patient was identified by name and date of birth  Mohamud Emery was informed that this is a telemedicine visit and that the visit is being conducted throughthe [employer] platform  She agrees to proceed     My office door was closed  No one else was in the room  She acknowledged consent and understanding of privacy and security of the video platform  The patient has agreed to participate and understands they can discontinue the visit at any time  Patient is aware this is a billable service  Subjective  Mohamud Emery is a 40 y o  female Behavioral Health Psychotherapy Progress Note    Psychotherapy Provided: Individual Psychotherapy     1  Generalized anxiety disorder        2   PTSD (post-traumatic stress disorder)            Goals addressed in "session: Goal 2    DATA: Clinician affirmed client's confrontation of anxieties; clinician assisted client in reframing negative thoughts and brainstormed alternative solutions with utilizing her support system  Clinician and client discussed routine-- implementing self care throughout her day  During this session, this clinician used the following therapeutic modalities: Client-centered Therapy, Solution-Focused Therapy and Supportive Psychotherapy    Substance Abuse was not addressed during this session  If the client is diagnosed with a co-occurring substance use disorder, please indicate any changes in the frequency or amount of use: NA  Stage of change for addressing substance use diagnoses: No substance use/Not applicable    ASSESSMENT:  Philly Cohen presents with a Euthymic/ normal mood  her affect is Normal range and intensity, which is congruent, with her mood and the content of the session  The client has made progress on their goals  Philly Cohen presents with a none risk of suicide, none risk of self-harm, and none risk of harm to others  For any risk assessment that surpasses a \"low\" rating, a safety plan must be developed  A safety plan was indicated: no  If yes, describe in detail NA    PLAN: Between sessions, Philly Cohen will implement self care  At the next session, the therapist will use Client-centered Therapy to address ongoing stressors  Behavioral Health Treatment Plan and Discharge Planning: Philly Cohen is aware of and agrees to continue to work on their treatment plan  They have identified and are working toward their discharge goals  yes    Visit start and stop times:    05/12/23  Start Time: 0903  Stop Time: 3261  Total Visit Time: 52 minutes   HPI     No past medical history on file  No past surgical history on file  No current outpatient medications on file  No current facility-administered medications for this visit          Not on File    Review of " Systems    Video Exam    There were no vitals filed for this visit      Physical Exam

## 2023-05-18 ENCOUNTER — TELEMEDICINE (OUTPATIENT)
Dept: PSYCHIATRY | Facility: CLINIC | Age: 43
End: 2023-05-18

## 2023-05-18 DIAGNOSIS — F41.1 GENERALIZED ANXIETY DISORDER: Primary | ICD-10-CM

## 2023-05-18 DIAGNOSIS — F43.10 PTSD (POST-TRAUMATIC STRESS DISORDER): ICD-10-CM

## 2023-05-19 NOTE — PSYCH
Virtual Regular Visit    Verification of patient location:    Patient is located at Home in the following state in which I hold an active license [place]      Assessment/Plan:    Problem List Items Addressed This Visit        Psychiatry Problems    Generalized anxiety disorder - Primary    PTSD (post-traumatic stress disorder)       Goals addressed in session: Goal 1          Reason for visit is No chief complaint on file  Encounter provider Luma Tolentino    Provider located at [address] [place] [address] [phone]      Recent Visits  Date Type Provider Dept   05/18/23 [facility] [address] [facility]   05/12/23 [facility]   Showing recent visits within past 7 days and meeting all other requirements  Future Appointments  No visits were found meeting these conditions  Showing future appointments within next 150 days and meeting all other requirements       The patient was identified by name and date of birth  Antonina Lundborg was informed that this is a telemedicine visit and that the visit is being conducted throughthe [employer] platform  She agrees to proceed     My office door was closed  No one else was in the room  She acknowledged consent and understanding of privacy and security of the video platform  The patient has agreed to participate and understands they can discontinue the visit at any time  Patient is aware this is a billable service  Subjective  Antonina Lundborg is a 40 y o  female Behavioral Health Psychotherapy Progress Note    Psychotherapy Provided: Individual Psychotherapy     1  Generalized anxiety disorder        2   PTSD (post-traumatic stress disorder)            Goals addressed in session: Goal 1     DATA: Clinician explored client's current stressors-- clinician affirmed and "acknowledged client's current progress and implementation   During this session, this clinician used the following therapeutic modalities: Bereavement Therapy, Client-centered Therapy and Supportive Psychotherapy    Substance Abuse was not addressed during this session  If the client is diagnosed with a co-occurring substance use disorder, please indicate any changes in the frequency or amount of use: NA  Stage of change for addressing substance use diagnoses: No substance use/Not applicable    ASSESSMENT:  Zuleyma Rg presents with a Euthymic/ normal mood  her affect is Normal range and intensity, which is congruent, with her mood and the content of the session  The client has made progress on their goals  Zuleyma Rg presents with a none risk of suicide, none risk of self-harm, and none risk of harm to others  For any risk assessment that surpasses a \"low\" rating, a safety plan must be developed  A safety plan was indicated: no  If yes, describe in detail NA    PLAN: Between sessions, Zuleyma Rg will continue to work through a routine  At the next session, the therapist will use Bereavement Therapy to address ongoing stressors  Behavioral Health Treatment Plan and Discharge Planning: Zuleyma Rg is aware of and agrees to continue to work on their treatment plan  They have identified and are working toward their discharge goals  yes    Visit start and stop times:    05/18/23  Start Time: 1012  Stop Time: 1058  Total Visit Time: 46 minutes   HPI     No past medical history on file  No past surgical history on file  No current outpatient medications on file  No current facility-administered medications for this visit  Not on File    Review of Systems    Video Exam    There were no vitals filed for this visit      Physical Exam     "

## 2023-05-25 ENCOUNTER — TELEMEDICINE (OUTPATIENT)
Dept: PSYCHIATRY | Facility: CLINIC | Age: 43
End: 2023-05-25

## 2023-05-25 DIAGNOSIS — F41.1 GENERALIZED ANXIETY DISORDER: Primary | ICD-10-CM

## 2023-05-26 NOTE — PSYCH
Virtual Regular Visit    Verification of patient location:    Patient is located at Home in the following state in which I hold an active license [place]      Assessment/Plan:    Problem List Items Addressed This Visit    None      Goals addressed in session: Goal 1          Reason for visit is No chief complaint on file  Encounter provider Luma Pulliam    Provider located at [address] [place] [address] [place] [address] [phone]      Recent Visits  Date Type Provider Dept   05/25/23 Telemedicine [place] [place] [facility]   Showing recent visits within past 7 days and meeting all other requirements  Future Appointments  No visits were found meeting these conditions  Showing future appointments within next 150 days and meeting all other requirements       The patient was identified by name and date of birth  Rolo Franco was informed that this is a telemedicine visit and that the visit is being conducted throughthe [employer] platform  She agrees to proceed     My office door was closed  No one else was in the room  She acknowledged consent and understanding of privacy and security of the video platform  The patient has agreed to participate and understands they can discontinue the visit at any time  Patient is aware this is a billable service  Subjective  Rolo Franco is a 40 y o  female Behavioral Health Psychotherapy Progress Note    Psychotherapy Provided: Individual Psychotherapy     No diagnosis found  Goals addressed in session: Goal 1     DATA: Clinician affirmed client's current coping skills and self care  Clinician encouraged client to set boundaries with her inlaws-- in conversation, electronically, and with her schedule     During this session, this clinician used the following therapeutic modalities: Client-centered Therapy, Solution-Focused Therapy and Supportive "Psychotherapy    Substance Abuse was not addressed during this session  If the client is diagnosed with a co-occurring substance use disorder, please indicate any changes in the frequency or amount of use: NA  Stage of change for addressing substance use diagnoses: No substance use/Not applicable    ASSESSMENT:  Shivam Arechiga presents with a Euthymic/ normal mood  her affect is Normal range and intensity, which is congruent, with her mood and the content of the session  The client has made progress on their goals  Shivam Arechiga presents with a none risk of suicide, none risk of self-harm, and none risk of harm to others  For any risk assessment that surpasses a \"low\" rating, a safety plan must be developed  A safety plan was indicated: no  If yes, describe in detail NA    PLAN: Between sessions, Shivam Arechiga will continue to set boundaries and implement a self care routine    At the next session, the therapist will use Client-centered Therapy to address ongoing stressors  Behavioral Health Treatment Plan and Discharge Planning: Shivam Arechiga is aware of and agrees to continue to work on their treatment plan  They have identified and are working toward their discharge goals  yes    Visit start and stop times:    05/25/23  Start Time: 1105  Stop Time: 1157  Total Visit Time: 52 minutes   HPI     No past medical history on file  No past surgical history on file  No current outpatient medications on file  No current facility-administered medications for this visit  Not on File    Review of Systems    Video Exam    There were no vitals filed for this visit      Physical Exam         "

## 2023-06-08 ENCOUNTER — TELEPHONE (OUTPATIENT)
Dept: PSYCHIATRY | Facility: CLINIC | Age: 43
End: 2023-06-08

## 2023-06-08 NOTE — TELEPHONE ENCOUNTER
Patient is calling regarding cancelling an appointment      Date/Time: 6/8/2023 at 3 pm     Reason:cant make it    Patient was rescheduled: YES [] NO [x]  If yes, when was Patient reschedule for: n/a    Patient requesting call back to reschedule: YES [x] NO []

## 2023-06-21 ENCOUNTER — TELEMEDICINE (OUTPATIENT)
Dept: PSYCHIATRY | Facility: CLINIC | Age: 43
End: 2023-06-21
Payer: COMMERCIAL

## 2023-06-21 DIAGNOSIS — F43.10 PTSD (POST-TRAUMATIC STRESS DISORDER): ICD-10-CM

## 2023-06-21 DIAGNOSIS — F41.1 GENERALIZED ANXIETY DISORDER: Primary | ICD-10-CM

## 2023-06-21 PROCEDURE — 90837 PSYTX W PT 60 MINUTES: CPT | Performed by: SOCIAL WORKER

## 2023-06-22 NOTE — PSYCH
Virtual Regular Visit    Verification of patient location:    Patient is located at Home in the following state in which I hold an [place] license [place]      Assessment/Plan:    Problem List Items Addressed This Visit        Psychiatry Problems    Generalized anxiety disorder - Primary    PTSD (post-traumatic stress disorder)       Goals addressed in session: Goal 2          Reason for visit is No chief complaint on file  Encounter provider Luma Darling    Provider located at [address] [place] [address]  Kayleigh Crowley [address] [place] [address] [phone]      Recent Visits  Date Type Provider Dept   06/21/23 Telemedicine [facility] [place] [place] [facility]   Showing recent visits within past 7 days and meeting all other requirements  Future Appointments  No visits were found meeting these conditions  Showing future appointments within next 150 days and meeting all other requirements       The patient was identified by name and date of birth  Elsa Reis was informed that this is a telemedicine visit and that the visit is being conducted throughthe [employer] platform  She agrees to proceed     My office door was closed  No one else was in the room  She acknowledged consent and understanding of privacy and security of the video platform  The patient has agreed to participate and understands they can discontinue the visit at any time  Patient is aware this is a billable service  Subjective  Elsa Reis is a 40 y o  female Behavioral Health Psychotherapy Progress Note    Psychotherapy Provided: Individual Psychotherapy     1  Generalized anxiety disorder        2  PTSD (post-traumatic stress disorder)            Goals addressed in session: Goal 2     DATA: Clinician explored client's negative core beliefs-- assisted client in identifying the belief of feeling unworthy of love   Clinician and client discussed "making mistakes-- the value and how she believes it takes from her self worth  During this session, this clinician used the following therapeutic modalities: Cognitive Behavioral Therapy, Mindfulness-based Strategies and Supportive Psychotherapy    Substance Abuse was not addressed during this session  If the client is diagnosed with a co-occurring substance use disorder, please indicate any changes in the frequency or amount of use: NA  Stage of change for addressing substance use diagnoses: No substance use/Not applicable    ASSESSMENT:  Grace Sims presents with a Euthymic/ normal mood  her affect is Normal range and intensity, which is congruent, with her mood and the content of the session  The client has made progress on their goals  Client explains that the breathing exercises are not working-- clinician encouraged client to practice her breathing every morning so as to increase her ability  Grace Sims presents with a none risk of suicide, none risk of self-harm, and none risk of harm to others  For any risk assessment that surpasses a \"low\" rating, a safety plan must be developed  A safety plan was indicated: no  If yes, describe in detail NA    PLAN: Between sessions, Grace Sims will practice breathing; challenge worst case scenarios; practice positive affirmations  At the next session, the therapist will use Client-centered Therapy to address ongoing stressors  Behavioral Health Treatment Plan and Discharge Planning: Grace Sims is aware of and agrees to continue to work on their treatment plan  They have identified and are working toward their discharge goals  yes    Visit start and stop times:    06/21/23  Start Time: 1402  Stop Time: 1500  Total Visit Time: 58 minutes   HPI     No past medical history on file  No past surgical history on file  No current outpatient medications on file  No current facility-administered medications for this visit          Not on " File    Review of Systems    Video Exam    There were no vitals filed for this visit      Physical Exam

## 2023-06-26 ENCOUNTER — TELEMEDICINE (OUTPATIENT)
Dept: PSYCHIATRY | Facility: CLINIC | Age: 43
End: 2023-06-26
Payer: COMMERCIAL

## 2023-06-26 DIAGNOSIS — F41.1 GENERALIZED ANXIETY DISORDER: Primary | ICD-10-CM

## 2023-06-26 PROCEDURE — 90837 PSYTX W PT 60 MINUTES: CPT | Performed by: SOCIAL WORKER

## 2023-06-26 NOTE — BH TREATMENT PLAN
Donta Charles [address]  1980     Date of Initial Psychotherapy Assessment: 12/28/22   Date of Current Treatment Plan: 06/27/23  Treatment Plan Target Date: 6/26/24  Treatment Plan Expiration Date: 12/26/23    Diagnosis:   1  Generalized anxiety disorder            Area(s) of Need: I have dreams about him    I want to know why this is happening  Cynthia Moy i want to accept that he is gone and life is different now  I jumble up my work and family needs all together    I get overwhelemed when something unexpected gets thrown in the mix        Long Term Goal 1 (in the client's own words): I want to make peace with the situation for what it is now    Stage of Change: Action    Target Date for completion: 6/26/24     Anticipated therapeutic modalities: berevement; compassion focused; trauma informed; strengths based     People identified to complete this goal: Client and clinician      Objective 1: (identify the means of measuring success in meeting the objective): Client will go through the 5 stages of grief regarding death of franko-- identifying these and processing each stage; client will allow herself to feel her feelings 5/5x; Client will identify at least 3 things that she has control over once a month  Client will practice positive self talk and will identify at least 3 personal strengths; client will practice mindfulness as she utilizes her strengths at least once a day  Objective 2: (identify the means of measuring success in meeting the objective): NA      Long Term Goal 2 (in the client's own words): I want to prevent panic attacks    I want to stop from becoming anxious     Stage of Change: Action    Target Date for completion: 6/26/24     Anticipated therapeutic modalities: somatic, mindfulness, breathwork, CBT, strengths based, exposure     People identified to complete this goal: Client and clinician      Objective 1: (identify the means of measuring "success in meeting the objective): Client will develop at least 5 coping mechanisms; client will identify her avoidance and will expose herself to anxiety inducing situations at least once a week; Client will acknowledge what is true, what she has control over and accept what she does not; Client will meditate once a day; Client will practice challenging negative core beliefs \" I am unworthy of love\" \" I am not enough\" daily  Client will process her fear of being alone-- client will identify AT LEAST 3 things what brings her comfort and peace; Client will identify at least 2 places that bring her peace, identifying at least 3 hobbies that she can do independently at least once a week; client will engage in self care at least once a week; Client  will practice placing her needs as a priority at least once a week;  Client will practice vulnerability at least once a week; client will practice utilizing her support system;      Objective 2: (identify the means of measuring success in meeting the objective): NA     Long Term Goal 3 (in the client's own words): NA    Stage of Change: Pre-contemplation    Target Date for completion: NA     Anticipated therapeutic modalities: NA     People identified to complete this goal: NA      Objective 1: (identify the means of measuring success in meeting the objective): NA      Objective 2: (identify the means of measuring success in meeting the objective): NA     I am currently under the care of a [facility] psychiatric provider: no    My [facility] psychiatric provider is: NA    I am currently taking psychiatric medications: No    I feel that I will be ready for discharge from mental health care when I reach the following (measurable goal/objective): When I dont have panic attacks in at least 90 days    For children and adults who have a legal guardian:   Has there been any change to custody orders and/or guardianship status? NA  If yes, attach updated documentation      I have " created my Crisis Plan and have been offered a copy of this plan    [address]: Diagnosis and Treatment Plan explained to Susi Guido acknowledges an understanding of their diagnosis  Jacinta Gama agrees to this treatment plan      I have been offered a copy of this Treatment Plan  yes

## 2023-06-27 NOTE — PSYCH
Virtual Regular Visit    Verification of patient location:    Patient is located at Home in the following state in which I hold an active license [place]      Assessment/Plan:    Problem List Items Addressed This Visit    None      Goals addressed in session: Goal 2          Reason for visit is No chief complaint on file  Encounter provider Tedra Prim[place], [place]    Provider located at [address] [place] [address] Benny [address] Quin[address] [place] [address] [phone]      Recent Visits  Date Type Provider Dept   06/26/23 [facility]medicine [facility] [address] [facility]   06/21/23 [facility]   Showing recent visits within past 7 days and meeting all other requirements  Future Appointments  No visits were found meeting these conditions  Showing future appointments within next 150 days and meeting all other requirements       The patient was identified by name and date of birth  Gerhard Escalante was informed that this is a telemedicine visit and that the visit is being conducted throughthe [employer] platform  She agrees to proceed     My office door was closed  No one else was in the room  She acknowledged consent and understanding of privacy and security of the video platform  The patient has agreed to participate and understands they can discontinue the visit at any time  Patient is aware this is a billable service  Subjective  Gerhard Escalante is a 40 y o  female Behavioral Health Psychotherapy Progress Note    Psychotherapy Provided: Individual Psychotherapy     No diagnosis found  Goals addressed in session: Goal 2     DATA: Clinician explored client's current stressors-- discussed the importance of positive affirmations-- leaning into the anxiety and validating her emotions   Clinician and client practiced validating anxiety, talking herself through it and "discovering her capability  Clinician affirmed client's progress with her routine and establishing boundaries  During this session, this clinician used the following therapeutic modalities: Client-centered Therapy, Cognitive Behavioral Therapy, Mindfulness-based Strategies and Supportive Psychotherapy    Substance Abuse was not addressed during this session  If the client is diagnosed with a co-occurring substance use disorder, please indicate any changes in the frequency or amount of use: NA  Stage of change for addressing substance use diagnoses: No substance use/Not applicable    ASSESSMENT:  Valorie Arthur presents with a Anxious mood  her affect is Normal range and intensity and Tearful, which is congruent, with her mood and the content of the session  The client has made progress on their goals  Valorie Arthur presents with a none risk of suicide, none risk of self-harm, and none risk of harm to others  For any risk assessment that surpasses a \"low\" rating, a safety plan must be developed  A safety plan was indicated: no  If yes, describe in detail NA    PLAN: Between sessions, Valorie Arthur will practice validating her emotions  At the next session, the therapist will use Client-centered Therapy, Cognitive Behavioral Therapy, Mindfulness-based Strategies, Solution-Focused Therapy and Supportive Psychotherapy to address ongoing stressors  Behavioral Health Treatment Plan and Discharge Planning: Valorie Arthur is aware of and agrees to continue to work on their treatment plan  They have identified and are working toward their discharge goals  yes    Visit start and stop times:    06/26/23  Start Time: 6934  Stop Time: 1404  Total Visit Time: 59 minutes   HPI     No past medical history on file  No past surgical history on file  No current outpatient medications on file  No current facility-administered medications for this visit          Not on File    Review of Systems    Video " Exam    There were no vitals filed for this visit      Physical Exam

## 2023-07-03 ENCOUNTER — TELEMEDICINE (OUTPATIENT)
Dept: PSYCHIATRY | Facility: CLINIC | Age: 43
End: 2023-07-03
Payer: COMMERCIAL

## 2023-07-03 DIAGNOSIS — F41.1 GENERALIZED ANXIETY DISORDER: Primary | ICD-10-CM

## 2023-07-03 PROCEDURE — 90837 PSYTX W PT 60 MINUTES: CPT | Performed by: SOCIAL WORKER

## 2023-07-04 NOTE — PSYCH
Virtual Regular Visit    Verification of patient location:    Patient is located at Home in the following state in which I hold an active license [place]      Assessment/Plan:    Problem List Items Addressed This Visit        Psychiatry Problems    Generalized anxiety disorder - Primary       Goals addressed in session: Goal 1          Reason for visit is No chief complaint on file. Encounter provider Allegra Muhammad    Provider located at [address] [place] [id number] [phone]      Recent Visits  Date Type Provider Dept   07/03/23 Telemedicine [facility] [place], [facility]   Showing recent visits within past 7 days and meeting all other requirements  Future Appointments  No visits were found meeting these conditions. Showing future appointments within next 150 days and meeting all other requirements       The patient was identified by name and date of birth. Edmond Orosco was informed that this is a telemedicine visit and that the visit is being conducted throughthe [employer] platform. She agrees to proceed. .  My office door was closed. No one else was in the room. She acknowledged consent and understanding of privacy and security of the video platform. The patient has agreed to participate and understands they can discontinue the visit at any time. Patient is aware this is a billable service. Subjective  Edmond Orosco is a 40 y.o. female Behavioral Health Psychotherapy Progress Note    Psychotherapy Provided: Individual Psychotherapy     1. Generalized anxiety disorder            Goals addressed in session: Goal 1     DATA: Clinician affirmed client's efforts of exploring her kids' emotions, meeting their needs, visiting their father's grave. Clinician explored client's feelings of guilt and discussed the complexity of having sadness and relief at the same time. Clinician explored client's Mu-ism beliefs and discussed the importance of Mormonism for her family, the benefits of going together vs not attending at all. During this session, this clinician used the following therapeutic modalities: Bereavement Therapy, Client-centered Therapy, Supportive Psychotherapy and Episcopalian Counseling    Substance Abuse was not addressed during this session. If the client is diagnosed with a co-occurring substance use disorder, please indicate any changes in the frequency or amount of use: NA. Stage of change for addressing substance use diagnoses: No substance use/Not applicable    ASSESSMENT:  Alanna Davis presents with a Euthymic/ normal mood. her affect is Normal range and intensity, which is congruent, with her mood and the content of the session. The client has made progress on their goals. Alanna Davis presents with a none risk of suicide, none risk of self-harm, and none risk of harm to others. For any risk assessment that surpasses a "low" rating, a safety plan must be developed. A safety plan was indicated: no  If yes, describe in detail NA    PLAN: Between sessions, Alanna Davis will attend her daughter's Mosque with the family. At the next session, the therapist will use Client-centered Therapy to address ongoing stressors. Behavioral Health Treatment Plan and Discharge Planning: Alanna Davis is aware of and agrees to continue to work on their treatment plan. They have identified and are working toward their discharge goals. yes    Visit start and stop times:    07/03/23  Start Time: 1308  Stop Time: 1405  Total Visit Time: 57 minutes . HPI     No past medical history on file. No past surgical history on file. No current outpatient medications on file. No current facility-administered medications for this visit. Not on File    Review of Systems    Video Exam    There were no vitals filed for this visit.     Physical Exam

## 2023-07-11 ENCOUNTER — TELEMEDICINE (OUTPATIENT)
Dept: PSYCHIATRY | Facility: CLINIC | Age: 43
End: 2023-07-11
Payer: COMMERCIAL

## 2023-07-11 DIAGNOSIS — F41.1 GENERALIZED ANXIETY DISORDER: Primary | ICD-10-CM

## 2023-07-11 PROCEDURE — 90834 PSYTX W PT 45 MINUTES: CPT | Performed by: SOCIAL WORKER

## 2023-07-13 NOTE — PSYCH
Virtual Regular Visit    Verification of patient location:    Patient is located at Home in the following state in which I hold an active license [place]      Assessment/Plan:    Problem List Items Addressed This Visit        Psychiatry Problems    Generalized anxiety disorder - Primary       Goals addressed in session: Goal 1          Reason for visit is No chief complaint on file. Encounter provider Allegra Schultz    Provider located at [address] [place] [address]  [facility] [address] [address] [phone]      Recent Visits  Date Type Provider Dept   07/11/23 Telemedicine [facility] [place], [place] [facility]   Showing recent visits within past 7 days and meeting all other requirements  Future Appointments  No visits were found meeting these conditions. Showing future appointments within next 150 days and meeting all other requirements       The patient was identified by name and date of birth. Shanthi Cordero was informed that this is a telemedicine visit and that the visit is being conducted throughthe [employer] platform. She agrees to proceed. .  My office door was closed. No one else was in the room. She acknowledged consent and understanding of privacy and security of the video platform. The patient has agreed to participate and understands they can discontinue the visit at any time. Patient is aware this is a billable service. Subjective  Shanthi Cordero is a 40 y.o. female Behavioral Health Psychotherapy Progress Note    Psychotherapy Provided: Individual Psychotherapy     1. Generalized anxiety disorder            Goals addressed in session: Goal 1     DATA: Clinician explored client's current stressors-- affirmed her decisions regarding comforting her daughter and children. Clinician and client discussed anxiety regarding dinner-- redecorating, changing times, and sitting with the discomfort.  Allowing for comfort and utilizing her support system. Clinician encouraged client to validate her daughter's emotions but to embrace the changes as they happen. During this session, this clinician used the following therapeutic modalities: Client-centered Therapy    Substance Abuse was not addressed during this session. If the client is diagnosed with a co-occurring substance use disorder, please indicate any changes in the frequency or amount of use: NA. Stage of change for addressing substance use diagnoses: No substance use/Not applicable    ASSESSMENT:  Annie Hastings presents with a Euthymic/ normal mood. her affect is Normal range and intensity, which is congruent, with her mood and the content of the session. The client has made progress on their goals. Annie Hastings presents with a none risk of suicide, none risk of self-harm, and none risk of harm to others. For any risk assessment that surpasses a "low" rating, a safety plan must be developed. A safety plan was indicated: no  If yes, describe in detail NA    PLAN: Between sessions, Annie Hastings will validate daughter and assist while they adapt to her new relationship. At the next session, the therapist will use Client-centered Therapy to address ongoging stressors. Behavioral Health Treatment Plan and Discharge Planning: Annie Hastings is aware of and agrees to continue to work on their treatment plan. They have identified and are working toward their discharge goals. yes    Visit start and stop times:    07/12/23  Start Time: 1210  Stop Time: 1302  Total Visit Time: 52 minutes . HPI     No past medical history on file. No past surgical history on file. No current outpatient medications on file. No current facility-administered medications for this visit. Not on File    Review of Systems    Video Exam    There were no vitals filed for this visit.     Physical Exam

## 2023-07-19 ENCOUNTER — TELEMEDICINE (OUTPATIENT)
Dept: PSYCHIATRY | Facility: CLINIC | Age: 43
End: 2023-07-19
Payer: COMMERCIAL

## 2023-07-19 DIAGNOSIS — F41.1 GENERALIZED ANXIETY DISORDER: Primary | ICD-10-CM

## 2023-07-19 PROCEDURE — 90837 PSYTX W PT 60 MINUTES: CPT | Performed by: SOCIAL WORKER

## 2023-07-21 NOTE — PSYCH
Virtual Regular Visit    Verification of patient location:    Patient is located at Home in the following state in which I hold an active license [place]      Assessment/Plan:    Problem List Items Addressed This Visit        Psychiatry Problems    Generalized anxiety disorder - Primary       Goals addressed in session: Goal 1          Reason for visit is No chief complaint on file. Encounter provider Allegra Gordon    Provider located at [address] [place] [id number] [phone]      Recent Visits  Date Type Provider Dept   07/19/23 Telemedicine [facility] [place], [place] [facility]   Showing recent visits within past 7 days and meeting all other requirements  Future Appointments  No visits were found meeting these conditions. Showing future appointments within next 150 days and meeting all other requirements       The patient was identified by name and date of birth. Malena Zacarias was informed that this is a telemedicine visit and that the visit is being conducted throughthe [employer] platform. She agrees to proceed. .  My office door was closed. No one else was in the room. She acknowledged consent and understanding of privacy and security of the video platform. The patient has agreed to participate and understands they can discontinue the visit at any time. Patient is aware this is a billable service. Subjective  Malena Zacarias is a 40 y.o. female Behavioral Health Psychotherapy Progress Note    Psychotherapy Provided: Individual Psychotherapy     1. Generalized anxiety disorder            Goals addressed in session: Goal 1     DATA: Clinician explored client's stressors-- identified temptation-- discussed Prov 5, seduction and self sabotage. Clinician encouraged client to be mindful and to figure out what she wants within her current relationship.  Clinician and client validated her emotions and assisted in recognizing her mother's contributions into her feelings. Adjustments within her emotions and her new changes within her relationship. During this session, this clinician used the following therapeutic modalities: Client-centered Therapy, Cognitive Behavioral Therapy and Taoism Counseling     Substance Abuse was not addressed during this session. If the client is diagnosed with a co-occurring substance use disorder, please indicate any changes in the frequency or amount of use: NA. Stage of change for addressing substance use diagnoses: No substance use/Not applicable    ASSESSMENT:  Jason Adorno presents with a Euthymic/ normal and Anxious mood. her affect is Normal range and intensity, which is congruent, with her mood and the content of the session. The client has made progress on their goals. Jason Adorno presents with a none risk of suicide, none risk of self-harm, and none risk of harm to others. For any risk assessment that surpasses a "low" rating, a safety plan must be developed. A safety plan was indicated: no  If yes, describe in detail NA    PLAN: Between sessions, Jason Adorno will contemplate what she wants from her relaitonships. At the next session, the therapist will use Client-centered Therapy to address ongoing stressors  . Behavioral Health Treatment Plan and Discharge Planning: Jason Adorno is aware of and agrees to continue to work on their treatment plan. They have identified and are working toward their discharge goals. yes    Visit start and stop times:    07/19/23  Start Time: 1410  Stop Time: 1503  Total Visit Time: 53 minutes . HPI     No past medical history on file. No past surgical history on file. No current outpatient medications on file. No current facility-administered medications for this visit. Not on File    Review of Systems    Video Exam    There were no vitals filed for this visit.     Physical Exam

## 2023-07-25 ENCOUNTER — TELEMEDICINE (OUTPATIENT)
Dept: PSYCHIATRY | Facility: CLINIC | Age: 43
End: 2023-07-25
Payer: COMMERCIAL

## 2023-07-25 DIAGNOSIS — F41.1 GENERALIZED ANXIETY DISORDER: Primary | ICD-10-CM

## 2023-07-25 PROCEDURE — 90834 PSYTX W PT 45 MINUTES: CPT | Performed by: SOCIAL WORKER

## 2023-07-26 NOTE — PSYCH
Virtual Regular Visit    Verification of patient location:    Patient is located at Home in the following state in which I hold an active license [place]      Assessment/Plan:    Problem List Items Addressed This Visit        Psychiatry Problems    Generalized anxiety disorder - Primary       Goals addressed in session: Goal 1          Reason for visit is No chief complaint on file. Encounter provider Allegra Watt    Provider located at [address] [place] [facility] [facility]  [address] [facility] [id number] [phone]      Recent Visits  Date Type Provider Dept   07/25/23 Telemedicine [address] [facility]   07/19/23 Telemedicine [facility]   Showing recent visits within past 7 days and meeting all other requirements  Future Appointments  No visits were found meeting these conditions. Showing future appointments within next 150 days and meeting all other requirements       The patient was identified by name and date of birth. Jade Nicolas was informed that this is a telemedicine visit and that the visit is being conducted throughthe [employer] platform. She agrees to proceed. .  My office door was closed. No one else was in the room. She acknowledged consent and understanding of privacy and security of the video platform. The patient has agreed to participate and understands they can discontinue the visit at any time. Patient is aware this is a billable service. Subjective  Jade Nicolas is a 40 y.o. female Behavioral Health Psychotherapy Progress Note    Psychotherapy Provided: Individual Psychotherapy     1.  Generalized anxiety disorder            Goals addressed in session: Goal 1     DATA: Clinician explored client's current decisions and affirmed her efforts in reassuring her daughter-- reenforcing love and acceptance--taking responsibility away from managing her daughter's feelings and just helping her to deal with them on her own. Clinician and client discussed ways to foster dependence in your children and explored her attachment with her mother. During this session, this clinician used the following therapeutic modalities: Client-centered Therapy, Cognitive Behavioral Therapy and Supportive Psychotherapy    Substance Abuse was not addressed during this session. If the client is diagnosed with a co-occurring substance use disorder, please indicate any changes in the frequency or amount of use: NA. Stage of change for addressing substance use diagnoses: No substance use/Not applicable    ASSESSMENT:  Nathalie Hwang presents with a Euthymic/ normal mood. her affect is Normal range and intensity, which is congruent, with her mood and the content of the session. The client has made progress on their goals. Nathalie Hwang presents with a none risk of suicide, none risk of self-harm, and none risk of harm to others. For any risk assessment that surpasses a "low" rating, a safety plan must be developed. A safety plan was indicated: no  If yes, describe in detail NA    PLAN: Between sessions, Nathalie Hwang will continue to reassure her daughter. At the next session, the therapist will use Client-centered Therapy to address ongoing stressors. Behavioral Health Treatment Plan and Discharge Planning: Nathalie Hwang is aware of and agrees to continue to work on their treatment plan. They have identified and are working toward their discharge goals. yes    Visit start and stop times:    07/25/23  Start Time: 1212  Stop Time: 1259  Total Visit Time: 47 minutes . HPI     No past medical history on file. No past surgical history on file. No current outpatient medications on file. No current facility-administered medications for this visit.         Not on File    Review of Systems    Video Exam    There were no vitals filed for this visit.    Physical Exam

## 2023-08-02 ENCOUNTER — TELEMEDICINE (OUTPATIENT)
Dept: PSYCHIATRY | Facility: CLINIC | Age: 43
End: 2023-08-02
Payer: COMMERCIAL

## 2023-08-02 DIAGNOSIS — F41.1 GENERALIZED ANXIETY DISORDER: Primary | ICD-10-CM

## 2023-08-02 PROCEDURE — 90837 PSYTX W PT 60 MINUTES: CPT | Performed by: SOCIAL WORKER

## 2023-08-03 NOTE — PSYCH
Virtual Regular Visit    Verification of patient location:    Patient is located at Home in the following state in which I hold an active license [place]      Assessment/Plan:    Problem List Items Addressed This Visit        Psychiatry Problems    Generalized anxiety disorder - Primary       Goals addressed in session: Goal 2          Reason for visit is No chief complaint on file. Encounter provider Jorden [place], [place]    Provider located at [address] [place] [id number] [phone]      Recent Visits  Date Type Provider Dept   08/02/23 Telemedicine [facility] [place], [place] [facility]   Showing recent visits within past 7 days and meeting all other requirements  Future Appointments  No visits were found meeting these conditions. Showing future appointments within next 150 days and meeting all other requirements       The patient was identified by name and date of birth. Genie Mott was informed that this is a telemedicine visit and that the visit is being conducted throughthe [employer] platform. She agrees to proceed. .  My office door was closed. No one else was in the room. She acknowledged consent and understanding of privacy and security of the video platform. The patient has agreed to participate and understands they can discontinue the visit at any time. Patient is aware this is a billable service. Subjective  Genie Mott is a 40 y.o. female Behavioral Health Psychotherapy Progress Note    Psychotherapy Provided: Individual Psychotherapy     1. Generalized anxiety disorder            Goals addressed in session: Goal 2     DATA: Clinician explored client's current concerns regarding her nightmares-- resulting in anxiety the following day.  Clinician encouraged client to recognize the bad dreams as a trigger for a rough day-- advised her to do some self care but also discussed the risks of excessive caffeine consumption. During this session, this clinician used the following therapeutic modalities: Client-centered Therapy, Cognitive Behavioral Therapy and Supportive Psychotherapy    Substance Abuse was not addressed during this session. If the client is diagnosed with a co-occurring substance use disorder, please indicate any changes in the frequency or amount of use: Na. Stage of change for addressing substance use diagnoses: No substance use/Not applicable    ASSESSMENT:  Memo Rivera presents with a Euthymic/ normal mood. her affect is Normal range and intensity, which is congruent, with her mood and the content of the session. The client has made progress on their goals. Memo Rivera presents with a none risk of suicide, none risk of self-harm, and none risk of harm to others. For any risk assessment that surpasses a "low" rating, a safety plan must be developed. A safety plan was indicated: no  If yes, describe in detail NA    PLAN: Between sessions, Memo Rivera will continue to acknowledge her anxiety and exercise self care. At the next session, the therapist will use Client-centered Therapy to address ongoing stressors. Behavioral Health Treatment Plan and Discharge Planning: Memo Rivera is aware of and agrees to continue to work on their treatment plan. They have identified and are working toward their discharge goals. yes    Visit start and stop times:    08/02/23  Start Time: 1406  Stop Time: 1508  Total Visit Time: 62 minutes . HPI     No past medical history on file. No past surgical history on file. No current outpatient medications on file. No current facility-administered medications for this visit. Not on File    Review of Systems    Video Exam    There were no vitals filed for this visit.     Physical Exam

## 2023-08-09 ENCOUNTER — TELEPHONE (OUTPATIENT)
Dept: PSYCHIATRY | Facility: CLINIC | Age: 43
End: 2023-08-09

## 2023-08-11 ENCOUNTER — TELEMEDICINE (OUTPATIENT)
Dept: PSYCHIATRY | Facility: CLINIC | Age: 43
End: 2023-08-11
Payer: COMMERCIAL

## 2023-08-11 DIAGNOSIS — F43.10 PTSD (POST-TRAUMATIC STRESS DISORDER): ICD-10-CM

## 2023-08-11 DIAGNOSIS — F41.1 GENERALIZED ANXIETY DISORDER: Primary | ICD-10-CM

## 2023-08-11 PROCEDURE — 90837 PSYTX W PT 60 MINUTES: CPT | Performed by: SOCIAL WORKER

## 2023-08-11 NOTE — PSYCH
Behavioral Health Psychotherapy Progress Note    Psychotherapy Provided: Individual Psychotherapy     1. Generalized anxiety disorder        2. PTSD (post-traumatic stress disorder)            Goals addressed in session: Goal 1     DATA: Clinician explored client's current struggles with being with her boyfriend and also previous fling. Clinician acknowledged that she is feeling pressure from her mother and also needing encouragement. Clinician discussed the temptation of the flesh and discussed problems that are happening with her children while she is preoccupied. During this session, this clinician used the following therapeutic modalities: Client-centered Therapy, Cognitive Behavioral Therapy and Supportive Psychotherapy    Substance Abuse was not addressed during this session. If the client is diagnosed with a co-occurring substance use disorder, please indicate any changes in the frequency or amount of use: NA. Stage of change for addressing substance use diagnoses: No substance use/Not applicable    ASSESSMENT:  Edmond Orosco presents with a Anxious mood. her affect is Normal range and intensity, which is congruent, with her mood and the content of the session. The client has made progress on their goals. Edmond Orosco presents with a none risk of suicide, none risk of self-harm, and none risk of harm to others. For any risk assessment that surpasses a "low" rating, a safety plan must be developed. A safety plan was indicated: yes  If yes, describe in detail NA    PLAN: Between sessions, Edmond Orosco will assist her daughter in feeling her emotions. At the next session, the therapist will use Client-centered Therapy to address ongoing stressors. Behavioral Health Treatment Plan and Discharge Planning: Edmond Orosco is aware of and agrees to continue to work on their treatment plan. They have identified and are working toward their discharge goals.  yes    Visit start and stop times:    08/11/23  Start Time: 1102  Stop Time: 1205  Total Visit Time: 63 minutes

## 2023-08-15 ENCOUNTER — TELEMEDICINE (OUTPATIENT)
Dept: PSYCHIATRY | Facility: CLINIC | Age: 43
End: 2023-08-15
Payer: COMMERCIAL

## 2023-08-15 DIAGNOSIS — F43.10 PTSD (POST-TRAUMATIC STRESS DISORDER): ICD-10-CM

## 2023-08-15 DIAGNOSIS — F41.1 GENERALIZED ANXIETY DISORDER: Primary | ICD-10-CM

## 2023-08-15 PROCEDURE — 90837 PSYTX W PT 60 MINUTES: CPT | Performed by: SOCIAL WORKER

## 2023-08-17 NOTE — PSYCH
Virtual Regular Visit    Verification of patient location:    Patient is located at Home in the following state in which I hold an active license [place]      Assessment/Plan:    Problem List Items Addressed This Visit        Psychiatry Problems    Generalized anxiety disorder - Primary    PTSD (post-traumatic stress disorder)       Goals addressed in session: Goal 1 and Goal 2          Reason for visit is No chief complaint on file. Encounter provider [place], [place]    Provider located at [address] [place] [id number] [phone]      Recent Visits  Date Type Provider Dept   08/15/23 Telemedicine [facility] [address] [facility]   08/11/23 Telemedicine [facility]   Showing recent visits within past 7 days and meeting all other requirements  Future Appointments  No visits were found meeting these conditions. Showing future appointments within next 150 days and meeting all other requirements       The patient was identified by name and date of birth. Jeanette Bradshaw was informed that this is a telemedicine visit and that the visit is being conducted throughthe [employer] platform. She agrees to proceed. .  My office door was closed. No one else was in the room. She acknowledged consent and understanding of privacy and security of the video platform. The patient has agreed to participate and understands they can discontinue the visit at any time. Patient is aware this is a billable service. Subjective  Jeanette Bradshaw is a 40 y.o. female Behavioral Health Psychotherapy Progress Note    Psychotherapy Provided: Individual Psychotherapy     1. Generalized anxiety disorder        2.  PTSD (post-traumatic stress disorder)            Goals addressed in session: Goal 1 and Goal 2     DATA: Clinician assisted client in recognizing her triggers of her PTSD; clinician educated client on adrenaline, cortisol, and physiological responses to trauma. Clinician and client discussed consistency with the children and raymundo as they have suffered a trauma with the loss of their father. During this session, this clinician used the following therapeutic modalities: Client-centered Therapy, Cognitive Behavioral Therapy, Mindfulness-based Strategies and Supportive Psychotherapy    Substance Abuse was not addressed during this session. If the client is diagnosed with a co-occurring substance use disorder, please indicate any changes in the frequency or amount of use: NA. Stage of change for addressing substance use diagnoses: No substance use/Not applicable    ASSESSMENT:  Viri Marcano presents with a Anxious mood. her affect is Normal range and intensity and Tearful, which is congruent, with her mood and the content of the session. The client has made progress on their goals. Viri Marcano presents with a none risk of suicide, none risk of self-harm, and none risk of harm to others. For any risk assessment that surpasses a "low" rating, a safety plan must be developed. A safety plan was indicated: no  If yes, describe in detail NA    PLAN: Between sessions, Viri Marcano will practice consistency. At the next session, the therapist will use Client-centered Therapy to address ongoing stressors. Behavioral Health Treatment Plan and Discharge Planning: Viri Marcano is aware of and agrees to continue to work on their treatment plan. They have identified and are working toward their discharge goals. yes    Visit start and stop times:    08/15/23  Start Time: 1205  Stop Time: 1259  Total Visit Time: 54 minutes . HPI     No past medical history on file. No past surgical history on file. No current outpatient medications on file. No current facility-administered medications for this visit.         Not on File    Review of Systems    Video Exam    There were no vitals filed for this visit.     Physical Exam

## 2023-08-23 ENCOUNTER — TELEMEDICINE (OUTPATIENT)
Dept: PSYCHIATRY | Facility: CLINIC | Age: 43
End: 2023-08-23
Payer: COMMERCIAL

## 2023-08-23 DIAGNOSIS — F41.1 GENERALIZED ANXIETY DISORDER: Primary | ICD-10-CM

## 2023-08-23 PROCEDURE — 90837 PSYTX W PT 60 MINUTES: CPT | Performed by: SOCIAL WORKER

## 2023-08-25 NOTE — PSYCH
Virtual Regular Visit    Verification of patient location:    Patient is located at Home in the following state in which I hold an active license [place]      Assessment/Plan:    Problem List Items Addressed This Visit    None      Goals addressed in session: Goal 1          Reason for visit is No chief complaint on file. Encounter provider Camille [place], [place]    Provider located at [address] [place] [id number] [phone]      Recent Visits  Date Type Provider Dept   08/23/23 Telemedicine [place] [place] [facility]   Showing recent visits within past 7 days and meeting all other requirements  Future Appointments  No visits were found meeting these conditions. Showing future appointments within next 150 days and meeting all other requirements       The patient was identified by name and date of birth. Briana Cohen was informed that this is a telemedicine visit and that the visit is being conducted throughthe [employer] platform. She agrees to proceed. .  My office door was closed. No one else was in the room. She acknowledged consent and understanding of privacy and security of the video platform. The patient has agreed to participate and understands they can discontinue the visit at any time. Patient is aware this is a billable service. Subjective  Briana Cohen is a 40 y.o. female Behavioral Health Psychotherapy Progress Note    Psychotherapy Provided: Individual Psychotherapy     No diagnosis found. Goals addressed in session: Goal 1     DATA: Clinician   During this session, this clinician used the following therapeutic modalities: Client-centered Therapy, Cognitive Behavioral Therapy and Mindfulness-based Strategies    Substance Abuse was not addressed during this session.  If the client is diagnosed with a co-occurring substance use disorder, please indicate any changes in the frequency or amount of use: NA. Stage of change for addressing substance use diagnoses: No substance use/Not applicable    ASSESSMENT:  Jeni Terrazas presents with a Euthymic/ normal mood. her affect is Normal range and intensity, which is congruent, with her mood and the content of the session. The client has made progress on their goals. Jeni Terrazas presents with a none risk of suicide, none risk of self-harm, and none risk of harm to others. For any risk assessment that surpasses a "low" rating, a safety plan must be developed. A safety plan was indicated: no  If yes, describe in detail NA    PLAN: Between sessions, Jeni Terrazas will continue to incorporate consistency with the kids for school; will practice asking for help. . At the next session, the therapist will use Client-centered Therapy to address ongoing stressors. Behavioral Health Treatment Plan and Discharge Planning: Jeni Terrazas is aware of and agrees to continue to work on their treatment plan. They have identified and are working toward their discharge goals. yes    Visit start and stop times:    08/23/23  Start Time: 6731  Stop Time: 1504  Total Visit Time: 61 minutes . HPI     No past medical history on file. No past surgical history on file. No current outpatient medications on file. No current facility-administered medications for this visit. Not on File    Review of Systems    Video Exam    There were no vitals filed for this visit.     Physical Exam

## 2023-08-29 ENCOUNTER — TELEMEDICINE (OUTPATIENT)
Dept: PSYCHIATRY | Facility: CLINIC | Age: 43
End: 2023-08-29
Payer: COMMERCIAL

## 2023-08-29 DIAGNOSIS — F43.10 PTSD (POST-TRAUMATIC STRESS DISORDER): ICD-10-CM

## 2023-08-29 DIAGNOSIS — F41.1 GENERALIZED ANXIETY DISORDER: Primary | ICD-10-CM

## 2023-08-29 PROCEDURE — 90837 PSYTX W PT 60 MINUTES: CPT | Performed by: SOCIAL WORKER

## 2023-08-31 NOTE — PSYCH
Virtual Regular Visit    Verification of patient location:    Patient is located at Home in the following state in which I hold an active license [place]      Assessment/Plan:    Problem List Items Addressed This Visit    None      Goals addressed in session: Goal 1          Reason for visit is No chief complaint on file. Encounter provider Allegra Mensah    Provider located at [address] [place] [id number] [phone]      Recent Visits  Date Type Provider Dept   08/29/23 Telemedicine [facility] [place] [place] [facility]   Showing recent visits within past 7 days and meeting all other requirements  Future Appointments  No visits were found meeting these conditions. Showing future appointments within next 150 days and meeting all other requirements       The patient was identified by name and date of birth. Theresa Jackman was informed that this is a telemedicine visit and that the visit is being conducted throughthe [employer] platform. She agrees to proceed. .  My office door was closed. No one else was in the room. She acknowledged consent and understanding of privacy and security of the video platform. The patient has agreed to participate and understands they can discontinue the visit at any time. Patient is aware this is a billable service. Subjective  Theresa Jackman is a 40 y.o. female Behavioral Health Psychotherapy Progress Note    Psychotherapy Provided: Individual Psychotherapy     No diagnosis found. Goals addressed in session: Goal 1     DATA: Clinician explored client's current stressors-- discussed her avoidance-- expressing her emotions and looking to others for emotional relief. . Clinician affirmed client's efforts with consistency with her children and assisting her children with discussing their emotions in a safe environment.    During this session, this clinician used the following therapeutic modalities: Client-centered Therapy, Cognitive Behavioral Therapy, Solution-Focused Therapy and Supportive Psychotherapy    Substance Abuse was not addressed during this session. If the client is diagnosed with a co-occurring substance use disorder, please indicate any changes in the frequency or amount of use: NA. Stage of change for addressing substance use diagnoses: No substance use/Not applicable    ASSESSMENT:  Rhoda Mac presents with a Euthymic/ normal mood. her affect is Normal range and intensity, which is congruent, with her mood and the content of the session. The client has made progress on their goals. Rhoda Mac presents with a none risk of suicide, none risk of self-harm, and none risk of harm to others. For any risk assessment that surpasses a "low" rating, a safety plan must be developed. A safety plan was indicated: no  If yes, describe in detail NA    PLAN: Between sessions, Rhoda Mac will continue to expose herself to anxiety inducing situations. At the next session, the therapist will use Client-centered Therapy to address ongoing stressors. Behavioral Health Treatment Plan and Discharge Planning: Rhoda Mac is aware of and agrees to continue to work on their treatment plan. They have identified and are working toward their discharge goals. yes    Visit start and stop times:    08/29/23  Start Time: 1205  Stop Time: 1259  Total Visit Time: 54 minutes . HPI     No past medical history on file. No past surgical history on file. No current outpatient medications on file. No current facility-administered medications for this visit. Not on File    Review of Systems    Video Exam    There were no vitals filed for this visit.     Physical Exam

## 2023-09-06 ENCOUNTER — TELEMEDICINE (OUTPATIENT)
Dept: PSYCHIATRY | Facility: CLINIC | Age: 43
End: 2023-09-06
Payer: COMMERCIAL

## 2023-09-06 DIAGNOSIS — F43.10 PTSD (POST-TRAUMATIC STRESS DISORDER): ICD-10-CM

## 2023-09-06 DIAGNOSIS — F41.1 GENERALIZED ANXIETY DISORDER: Primary | ICD-10-CM

## 2023-09-06 PROCEDURE — 90837 PSYTX W PT 60 MINUTES: CPT | Performed by: SOCIAL WORKER

## 2023-09-07 NOTE — PSYCH
Virtual Regular Visit    Verification of patient location:    Patient is located at Home in the following state in which I hold an active license [place]      Assessment/Plan:    Problem List Items Addressed This Visit        Psychiatry Problems    Generalized anxiety disorder - Primary    PTSD (post-traumatic stress disorder)       Goals addressed in session: Goal 1          Reason for visit is No chief complaint on file. Encounter provider Allegra Lancaster    Provider located at [address] [place] [id number] [phone]      Recent Visits  Date Type Provider Dept   09/06/23 Telemedicine [facility] [place] [place] [facility]   Showing recent visits within past 7 days and meeting all other requirements  Future Appointments  No visits were found meeting these conditions. Showing future appointments within next 150 days and meeting all other requirements       The patient was identified by name and date of birth. Danielle Chavez was informed that this is a telemedicine visit and that the visit is being conducted throughthe [employer] platform. She agrees to proceed. .  My office door was closed. No one else was in the room. She acknowledged consent and understanding of privacy and security of the video platform. The patient has agreed to participate and understands they can discontinue the visit at any time. Patient is aware this is a billable service. Subjective  Danielle Chavez is a 40 y.o. female Behavioral Health Psychotherapy Progress Note    Psychotherapy Provided: Individual Psychotherapy     1. Generalized anxiety disorder        2.  PTSD (post-traumatic stress disorder)            Goals addressed in session: Goal 1     DATA: Clinician explored client's current stressors-- discussed transitions with her children-- and following her feelings and logical reasoning vs being pushed by someone else to make decisions. Clinician and client processed the negative associations her eldest son has in regard to his behaviors, temperament, and his father. During this session, this clinician used the following therapeutic modalities: Client-centered Therapy, Cognitive Behavioral Therapy and Supportive Psychotherapy    Substance Abuse was not addressed during this session. If the client is diagnosed with a co-occurring substance use disorder, please indicate any changes in the frequency or amount of use: NA. Stage of change for addressing substance use diagnoses: No substance use/Not applicable    ASSESSMENT:  Jessica Holder presents with a Euthymic/ normal mood. her affect is Normal range and intensity, which is congruent, with her mood and the content of the session. The client has made progress on their goals. Jessica Holder presents with a none risk of suicide, none risk of self-harm, and none risk of harm to others. For any risk assessment that surpasses a "low" rating, a safety plan must be developed. A safety plan was indicated: no  If yes, describe in detail NA    PLAN: Between sessions, Jessica Holder will  Practice utilizing slow transitions within her family; as well as identifying positive associations with their father and their behavior. . At the next session, the therapist will use Client-centered Therapy to address ongoing stressors. Behavioral Health Treatment Plan and Discharge Planning: Jessica Holder is aware of and agrees to continue to work on their treatment plan. They have identified and are working toward their discharge goals. yes    Visit start and stop times:    09/06/23  Start Time: 1308  Stop Time: 1409  Total Visit Time: 61 minutes . HPI     No past medical history on file. No past surgical history on file. No current outpatient medications on file. No current facility-administered medications for this visit.         Not on File    Review of Systems    Video Exam    There were no vitals filed for this visit.     Physical Exam

## 2023-09-11 ENCOUNTER — TELEMEDICINE (OUTPATIENT)
Dept: PSYCHIATRY | Facility: CLINIC | Age: 43
End: 2023-09-11
Payer: COMMERCIAL

## 2023-09-11 DIAGNOSIS — F41.1 GENERALIZED ANXIETY DISORDER: Primary | ICD-10-CM

## 2023-09-11 DIAGNOSIS — F43.10 PTSD (POST-TRAUMATIC STRESS DISORDER): ICD-10-CM

## 2023-09-11 PROCEDURE — 90837 PSYTX W PT 60 MINUTES: CPT | Performed by: SOCIAL WORKER

## 2023-09-12 NOTE — PSYCH
Virtual Regular Visit    Verification of patient location:    Patient is located at Home in the following state in which I hold an active license [place]      Assessment/Plan:    Problem List Items Addressed This Visit        Psychiatry Problems    Generalized anxiety disorder - Primary    PTSD (post-traumatic stress disorder)       Goals addressed in session: Goal 1          Reason for visit is No chief complaint on file. Encounter provider Allegra Ramírez    Provider located at [address] [place] [id number] [phone]      Recent Visits  Date Type Provider Dept   09/11/23 Telemedicine [facility] [place] [place] [facility]   09/06/23 Telemedicine [facility] [place] [place] [facility]   Showing recent visits within past 7 days and meeting all other requirements  Future Appointments  No visits were found meeting these conditions. Showing future appointments within next 150 days and meeting all other requirements       The patient was identified by name and date of birth. Kaelyn Sheppadr was informed that this is a telemedicine visit and that the visit is being conducted throughthe [employer] platform. She agrees to proceed. .  My office door was closed. No one else was in the room. She acknowledged consent and understanding of privacy and security of the video platform. The patient has agreed to participate and understands they can discontinue the visit at any time. Patient is aware this is a billable service. Subjective  Kaelyn Sheppard is a 40 y.o. female Behavioral Health Psychotherapy Progress Note    Psychotherapy Provided: Individual Psychotherapy     1. Generalized anxiety disorder        2.  PTSD (post-traumatic stress disorder)            Goals addressed in session: Goal 1     DATA: Clinician assisted client in identifying conflictual emotions; clinician encouraged client to rid herself of situations that tempt her into maladaptive coping skills-- recognizing a tendency to avoid and habits that increase instability. Clinician affirmed client's efforts with her children and encouraged her to spend her attention and to assess their needs. During this session, this clinician used the following therapeutic modalities: Client-centered Therapy, Cognitive Behavioral Therapy, Solution-Focused Therapy, Supportive Psychotherapy and [facility]ian counseling    Substance Abuse was not addressed during this session. If the client is diagnosed with a co-occurring substance use disorder, please indicate any changes in the frequency or amount of use: NA. Stage of change for addressing substance use diagnoses: No substance use/Not applicable    ASSESSMENT:  Becka Marcus presents with a Anxious mood. her affect is Normal range and intensity, which is congruent, with her mood and the content of the session. The client has made progress on their goals. Becka Marcus presents with a none risk of suicide, none risk of self-harm, and none risk of harm to others. For any risk assessment that surpasses a "low" rating, a safety plan must be developed. A safety plan was indicated: no  If yes, describe in detail NA    PLAN: Between sessions, Becka Marcus will continue to assess the needs of the household and will follow through. At the next session, the therapist will use Client-centered Therapy to address ongoing stressors. Behavioral Health Treatment Plan and Discharge Planning: Becka Marcus is aware of and agrees to continue to work on their treatment plan. They have identified and are working toward their discharge goals. yes    Visit start and stop times:    09/11/23  Start Time: 1408  Stop Time: 1507  Total Visit Time: 59 minutes . HPI     No past medical history on file. No past surgical history on file. No current outpatient medications on file.      No current facility-administered medications for this visit. Not on File    Review of Systems    Video Exam    There were no vitals filed for this visit.     Physical Exam

## 2023-09-18 ENCOUNTER — TELEMEDICINE (OUTPATIENT)
Dept: PSYCHIATRY | Facility: CLINIC | Age: 43
End: 2023-09-18
Payer: COMMERCIAL

## 2023-09-18 DIAGNOSIS — F43.10 PTSD (POST-TRAUMATIC STRESS DISORDER): ICD-10-CM

## 2023-09-18 DIAGNOSIS — F41.1 GENERALIZED ANXIETY DISORDER: Primary | ICD-10-CM

## 2023-09-18 PROCEDURE — 90837 PSYTX W PT 60 MINUTES: CPT | Performed by: SOCIAL WORKER

## 2023-09-21 NOTE — PSYCH
Virtual Regular Visit    Verification of patient location:    Patient is located at Home in the following state in which I hold an active license [place]      Assessment/Plan:    Problem List Items Addressed This Visit        Psychiatry Problems    Generalized anxiety disorder - Primary    PTSD (post-traumatic stress disorder)       Goals addressed in session: Goal 1          Reason for visit is No chief complaint on file. Encounter provider Allegra Teran    Provider located at [address] [place] [id number] [phone]      Recent Visits  Date Type Provider Dept   09/18/23 Telemedicine [facility] [place] [place] [facility]   Showing recent visits within past 7 days and meeting all other requirements  Future Appointments  No visits were found meeting these conditions. Showing future appointments within next 150 days and meeting all other requirements       The patient was identified by name and date of birth. Miah Leal was informed that this is a telemedicine visit and that the visit is being conducted throughthe [employer] platform. She agrees to proceed. .  My office door was closed. No one else was in the room. She acknowledged consent and understanding of privacy and security of the video platform. The patient has agreed to participate and understands they can discontinue the visit at any time. Patient is aware this is a billable service. Subjective  Miah Leal is a 40 y.o. female Behavioral Health Psychotherapy Progress Note    Psychotherapy Provided: Individual Psychotherapy     1. Generalized anxiety disorder        2.  PTSD (post-traumatic stress disorder)            Goals addressed in session: Goal 1     DATA: Clinician explored client's current stressors with her children-- assisted her in recognizing the progress in her children socially as well as with the rules at home. Clinician affirmed client's choice to spend time with boyfriend's family and leave her parents with her children. Clinician encouraged client to maintain male roles within her son's life as they will help him become a man. During this session, this clinician used the following therapeutic modalities: Client-centered Therapy and Cognitive Behavioral Therapy supportive     Substance Abuse was not addressed during this session. If the client is diagnosed with a co-occurring substance use disorder, please indicate any changes in the frequency or amount of use: NA. Stage of change for addressing substance use diagnoses: No substance use/Not applicable    ASSESSMENT:  Nury Pineda presents with a Euthymic/ normal mood. her affect is Normal range and intensity, which is congruent, with her mood and the content of the session. The client has made progress on their goals. Nury Pineda presents with a none risk of suicide, none risk of self-harm, and none risk of harm to others. For any risk assessment that surpasses a "low" rating, a safety plan must be developed. A safety plan was indicated: no  If yes, describe in detail NA    PLAN: Between sessions, Nury Pineda will attempt to have family game nights so as to build teamwork within the family. At the next session, the therapist will use Client-centered Therapy to address ongoing stressors. Behavioral Health Treatment Plan and Discharge Planning: Nury Pineda is aware of and agrees to continue to work on their treatment plan. They have identified and are working toward their discharge goals. yes    Visit start and stop times:    09/18/23  Start Time: 1304  Stop Time: 1357  Total Visit Time: 53 minutes . HPI     No past medical history on file. No past surgical history on file. No current outpatient medications on file. No current facility-administered medications for this visit.         Not on File    Review of Systems    Video Exam    There were no vitals filed for this visit.     Physical Exam

## 2023-10-03 ENCOUNTER — TELEMEDICINE (OUTPATIENT)
Dept: PSYCHIATRY | Facility: CLINIC | Age: 43
End: 2023-10-03
Payer: COMMERCIAL

## 2023-10-03 DIAGNOSIS — F43.10 PTSD (POST-TRAUMATIC STRESS DISORDER): ICD-10-CM

## 2023-10-03 DIAGNOSIS — F41.1 GENERALIZED ANXIETY DISORDER: Primary | ICD-10-CM

## 2023-10-03 PROCEDURE — 90837 PSYTX W PT 60 MINUTES: CPT | Performed by: SOCIAL WORKER

## 2023-10-06 NOTE — PSYCH
Virtual Regular Visit    Verification of patient location:    Patient is located at Home in the following state in which I hold an active license [place]      Assessment/Plan:    Problem List Items Addressed This Visit        Psychiatry Problems    Generalized anxiety disorder - Primary    PTSD (post-traumatic stress disorder)       Goals addressed in session: Goal 1          Reason for visit is No chief complaint on file. Encounter provider Allegra Valiente    Provider located at [address] [place] [id number] [phone]      Recent Visits  Date Type Provider Dept   10/03/23 Telemedicine [facility] [place] [place] [facility]   Showing recent visits within past 7 days and meeting all other requirements  Future Appointments  No visits were found meeting these conditions. Showing future appointments within next 150 days and meeting all other requirements       The patient was identified by name and date of birth. Yousuf Amador was informed that this is a telemedicine visit and that the visit is being conducted throughthe [employer] platform. She agrees to proceed. .  My office door was closed. No one else was in the room. She acknowledged consent and understanding of privacy and security of the video platform. The patient has agreed to participate and understands they can discontinue the visit at any time. Patient is aware this is a billable service. Subjective  Yousuf Amador is a 40 y.o. female Behavioral Health Psychotherapy Progress Note    Psychotherapy Provided: Individual Psychotherapy     1. Generalized anxiety disorder        2. PTSD (post-traumatic stress disorder)            Goals addressed in session: Goal 1     DATA: Clinician explored client's current grief-- validated her grief and assisted client in recognizing the benefits of her leaving her  when she did. Clinician and client discussed the choice to challenge negative thinking-- deciding in absolutes and the worthlessness of suicidality. Clinician allowed a safe place for ventilation   During this session, this clinician used the following therapeutic modalities: Bereavement Therapy, Client-centered Therapy and Supportive Psychotherapy    Substance Abuse was not addressed during this session. If the client is diagnosed with a co-occurring substance use disorder, please indicate any changes in the frequency or amount of use: NA. Stage of change for addressing substance use diagnoses: No substance use/Not applicable    ASSESSMENT:  Annika Trotter presents with a Euthymic/ normal mood. her affect is Normal range and intensity, which is congruent, with her mood and the content of the session. The client has made progress on their goals. Annika Trotter presents with a none risk of suicide, none risk of self-harm, and none risk of harm to others. For any risk assessment that surpasses a "low" rating, a safety plan must be developed. A safety plan was indicated: no  If yes, describe in detail NA    PLAN: Between sessions, Annika Trotter will continue to affirm her parenting. At the next session, the therapist will use Client-centered Therapy to address ongoing stressors. Behavioral Health Treatment Plan and Discharge Planning: Annika Trotter is aware of and agrees to continue to work on their treatment plan. They have identified and are working toward their discharge goals. yes    Visit start and stop times:    10/03/23  Start Time: 1308  Stop Time: 1403  Total Visit Time: 55 minutes . HPI     No past medical history on file. No past surgical history on file. No current outpatient medications on file. No current facility-administered medications for this visit. Not on File    Review of Systems    Video Exam    There were no vitals filed for this visit.     Physical Exam

## 2023-10-11 ENCOUNTER — TELEMEDICINE (OUTPATIENT)
Dept: PSYCHIATRY | Facility: CLINIC | Age: 43
End: 2023-10-11
Payer: COMMERCIAL

## 2023-10-11 DIAGNOSIS — F41.1 GENERALIZED ANXIETY DISORDER: Primary | ICD-10-CM

## 2023-10-11 DIAGNOSIS — F43.10 PTSD (POST-TRAUMATIC STRESS DISORDER): ICD-10-CM

## 2023-10-11 PROCEDURE — 90837 PSYTX W PT 60 MINUTES: CPT | Performed by: SOCIAL WORKER

## 2023-10-12 NOTE — PSYCH
Virtual Regular Visit    Verification of patient location:    Patient is located at Home in the following state in which I hold an active license [place]      Assessment/Plan:    Problem List Items Addressed This Visit          Psychiatry Problems    Generalized anxiety disorder - Primary    PTSD (post-traumatic stress disorder)       Goals addressed in session: Goal 1          Reason for visit is   Chief Complaint   Patient presents with    Virtual Regular Visit          Encounter provider Allegra Covarrubias    Provider located at [address] [place] [id number] [phone]      Recent Visits  Date Type Provider Dept   10/11/23 Telemedicine [facility] [place] [place] [facility]   Showing recent visits within past 7 days and meeting all other requirements  Future Appointments  No visits were found meeting these conditions. Showing future appointments within next 150 days and meeting all other requirements       The patient was identified by name and date of birth. Annika Trotter was informed that this is a telemedicine visit and that the visit is being conducted throughthe [employer] platform. She agrees to proceed. .  My office door was closed. No one else was in the room. She acknowledged consent and understanding of privacy and security of the video platform. The patient has agreed to participate and understands they can discontinue the visit at any time. Patient is aware this is a billable service. Subjective  Annika Trotter is a 40 y.o. female Behavioral Health Psychotherapy Progress Note    Psychotherapy Provided: Individual Psychotherapy     1. Generalized anxiety disorder        2. PTSD (post-traumatic stress disorder)            Goals addressed in session: Goal 1 and Goal 2     DATA: Clinician explored client's current stressors.  Discussed arguing with children-- apologizing without reason, becoming a victim and the wrongdoer in a situation. Clinician encouraged client to explicitly state rules, and have a conversation regarding what is expected and respected communication--ways to ask and to accept the answer of no. Clinician affirmed client's mothering techniques and reassured her of recognizing her triggers, validating her emotions, and correcting the behaviors. During this session, this clinician used the following therapeutic modalities: Client-centered Therapy, Cognitive Behavioral Therapy, and Supportive Psychotherapy    Substance Abuse was not addressed during this session. If the client is diagnosed with a co-occurring substance use disorder, please indicate any changes in the frequency or amount of use: NA. Stage of change for addressing substance use diagnoses: No substance use/Not applicable    ASSESSMENT:  Annika Trotter presents with a Euthymic/ normal mood. her affect is Normal range and intensity, which is congruent, with her mood and the content of the session. The client has made progress on their goals. Annika Trotter presents with a none risk of suicide, none risk of self-harm, and none risk of harm to others. For any risk assessment that surpasses a "low" rating, a safety plan must be developed. A safety plan was indicated: no  If yes, describe in detail NA    PLAN: Between sessions, Annika Trotter will have an explicit discussion with her son about expectations and respect-- will uphold consequences. At the next session, the therapist will use Client-centered Therapy to address ongoing stressers. Behavioral Health Treatment Plan and Discharge Planning: Annika Trotter is aware of and agrees to continue to work on their treatment plan. They have identified and are working toward their discharge goals. yes    Visit start and stop times:    10/11/23  Start Time: 8889  Stop Time: 1402  Total Visit Time: 57 minutes . HPI     No past medical history on file.     No past surgical history on file. No current outpatient medications on file. No current facility-administered medications for this visit. Not on File    Review of Systems    Video Exam    There were no vitals filed for this visit.     Physical Exam

## 2023-10-16 ENCOUNTER — TELEMEDICINE (OUTPATIENT)
Dept: PSYCHIATRY | Facility: CLINIC | Age: 43
End: 2023-10-16
Payer: COMMERCIAL

## 2023-10-16 DIAGNOSIS — F41.1 GENERALIZED ANXIETY DISORDER: Primary | ICD-10-CM

## 2023-10-16 DIAGNOSIS — F43.10 PTSD (POST-TRAUMATIC STRESS DISORDER): ICD-10-CM

## 2023-10-16 PROCEDURE — 90837 PSYTX W PT 60 MINUTES: CPT | Performed by: SOCIAL WORKER

## 2023-10-19 NOTE — PSYCH
Behavioral Health Psychotherapy Progress Note    Psychotherapy Provided: Individual Psychotherapy     1. Generalized anxiety disorder        2. PTSD (post-traumatic stress disorder)            Goals addressed in session: Goal 1     DATA: Clinician explored client's current stressors-- discussed dependency on her partner--increasing her anxiety and panic attacks by depending on partner to relieve her anxiety vs practicing her calming tactics on her own before utilizing her support system. Clinician and client discussed safety risks-- when in a risky situation and in a panic, defer to her partner. But when in a safe situation and in a panic-- practicing breathing techniques. During this session, this clinician used the following therapeutic modalities: Client-centered Therapy, Cognitive Behavioral Therapy, Mindfulness-based Strategies, Solution-Focused Therapy, and Supportive Psychotherapy    Substance Abuse was not addressed during this session. If the client is diagnosed with a co-occurring substance use disorder, please indicate any changes in the frequency or amount of use: NA. Stage of change for addressing substance use diagnoses: No substance use/Not applicable    ASSESSMENT:  Juan Hurtado presents with a Anxious mood. her affect is Normal range and intensity, which is congruent, with her mood and the content of the session. The client has made progress on their goals. Juan Hurtado presents with a none risk of suicide, none risk of self-harm, and none risk of harm to others. For any risk assessment that surpasses a "low" rating, a safety plan must be developed. A safety plan was indicated: no  If yes, describe in detail NA    PLAN: Between sessions, Juan Hurtado will continue to deescalate her panic attacks. At the next session, the therapist will use Client-centered Therapy to address ongoing stressors.     Behavioral Health Treatment Plan and Discharge Planning: Juan Hurtado is aware of and agrees to continue to work on their treatment plan. They have identified and are working toward their discharge goals. yes    Visit start and stop times:    10/16/23  Start Time: 1302  Stop Time: 1407  Total Visit Time: 65 minutes  Virtual Regular Visit    Verification of patient location:    Patient is located at Home in the following state in which I hold an active license [place]      Assessment/Plan:    Problem List Items Addressed This Visit          Psychiatry Problems    Generalized anxiety disorder - Primary    PTSD (post-traumatic stress disorder)       Goals addressed in session: Goal 1          Reason for visit is No chief complaint on file. Encounter provider Allegra Thompson    Provider located at [address] [place] [id number] [phone]      Recent Visits  Date Type Provider Dept   10/16/23 Telemedicine [address] [facility]   10/11/23 Telemedicine [facility]   Showing recent visits within past 7 days and meeting all other requirements  Future Appointments  No visits were found meeting these conditions. Showing future appointments within next 150 days and meeting all other requirements       The patient was identified by name and date of birth. Marcin Paniagua was informed that this is a telemedicine visit and that the visit is being conducted throughthe [employer] platform. She agrees to proceed. .  My office door was closed. No one else was in the room. She acknowledged consent and understanding of privacy and security of the video platform. The patient has agreed to participate and understands they can discontinue the visit at any time. Patient is aware this is a billable service. Subjective  Marcin Paniagua is a 40 y.o. female  . HPI     No past medical history on file.     No past surgical history on file.    No current outpatient medications on file. No current facility-administered medications for this visit. Not on File    Review of Systems    Video Exam    There were no vitals filed for this visit.     Physical Exam

## 2023-10-24 ENCOUNTER — TELEMEDICINE (OUTPATIENT)
Dept: PSYCHIATRY | Facility: CLINIC | Age: 43
End: 2023-10-24
Payer: COMMERCIAL

## 2023-10-24 DIAGNOSIS — F43.10 PTSD (POST-TRAUMATIC STRESS DISORDER): ICD-10-CM

## 2023-10-24 DIAGNOSIS — F41.1 GENERALIZED ANXIETY DISORDER: Primary | ICD-10-CM

## 2023-10-24 PROCEDURE — 90837 PSYTX W PT 60 MINUTES: CPT | Performed by: SOCIAL WORKER

## 2023-10-25 NOTE — PSYCH
Behavioral Health Psychotherapy Progress Note    Psychotherapy Provided: Individual Psychotherapy     1. Generalized anxiety disorder        2. PTSD (post-traumatic stress disorder)            Goals addressed in session: Goal 1 and Goal 2     DATA: Clinician explored client's current stressors-- assisted client in identifying progress made and discussed the grief stages-- going through them at different times. Clinician and client discussed her dependence and the compulsion she feels to have someone else tell her she is okay and she will be fine. Clinician challenged client's notion of being alone-- identified 16 people that she can rely on in an emergency situation and discussed the never ending issue of judgement from other people. During this session, this clinician used the following therapeutic modalities: Client-centered Therapy, Cognitive Behavioral Therapy, Solution-Focused Therapy, and Supportive Psychotherapy    Substance Abuse was not addressed during this session. If the client is diagnosed with a co-occurring substance use disorder, please indicate any changes in the frequency or amount of use: NA. Stage of change for addressing substance use diagnoses: No substance use/Not applicable    ASSESSMENT:  Nury Pineda presents with a Anxious mood. her affect is Normal range and intensity and Tearful, which is congruent, with her mood and the content of the session. The client has made progress on their goals. Nury Pineda presents with a none risk of suicide, none risk of self-harm, and none risk of harm to others. For any risk assessment that surpasses a "low" rating, a safety plan must be developed. A safety plan was indicated: no  If yes, describe in detail NA    PLAN: Between sessions, Nury Pineda will resist her compulsions for reassurance. At the next session, the therapist will use Client-centered Therapy to address ongoing stressors.     Behavioral Health Treatment Plan and Discharge Planning: James Callahna is aware of and agrees to continue to work on their treatment plan. They have identified and are working toward their discharge goals. yes    Visit start and stop times:    10/24/23  Start Time: 4058  Stop Time: 1304  Total Visit Time: 61 minutes  Virtual Regular Visit    Verification of patient location:    Patient is located at Home in the following state in which I hold an active license [place]      Assessment/Plan:    Problem List Items Addressed This Visit          Psychiatry Problems    Generalized anxiety disorder - Primary    PTSD (post-traumatic stress disorder)       Goals addressed in session: Goal 1 and Goal 2          Reason for visit is No chief complaint on file. Encounter provider Allegra Vargas    Provider located at [address] [place] [id number] [phone]      Recent Visits  Date Type Provider Dept   10/24/23 Telemedicine [place] [place] [facility]   Showing recent visits within past 7 days and meeting all other requirements  Future Appointments  No visits were found meeting these conditions. Showing future appointments within next 150 days and meeting all other requirements       The patient was identified by name and date of birth. James Callahan was informed that this is a telemedicine visit and that the visit is being conducted throughthe [employer] platform. She agrees to proceed. .  My office door was closed. No one else was in the room. She acknowledged consent and understanding of privacy and security of the video platform. The patient has agreed to participate and understands they can discontinue the visit at any time. Patient is aware this is a billable service. Subjective  James Callahan is a 40 y.o. female  . HPI     No past medical history on file. No past surgical history on file.     No current outpatient medications on file. No current facility-administered medications for this visit. Not on File    Review of Systems    Video Exam    There were no vitals filed for this visit.     Physical Exam

## 2023-10-31 ENCOUNTER — TELEMEDICINE (OUTPATIENT)
Dept: PSYCHIATRY | Facility: CLINIC | Age: 43
End: 2023-10-31
Payer: COMMERCIAL

## 2023-10-31 DIAGNOSIS — F41.1 GENERALIZED ANXIETY DISORDER: Primary | ICD-10-CM

## 2023-10-31 PROCEDURE — 90834 PSYTX W PT 45 MINUTES: CPT | Performed by: SOCIAL WORKER

## 2023-11-01 NOTE — PSYCH
Behavioral Health Psychotherapy Progress Note    Psychotherapy Provided: Individual Psychotherapy     No diagnosis found. Goals addressed in session: Goal 1     DATA: Clinician explored client's current stressors and affirmed her efforts of comforting her children and honoring the memory of their father. Clinician encouraged client to show her concern but also give choices-- allowing them to make decisions but not have full control over how the family moves forward. Clinician encouraged client to express her ex 's death in a way specific to those children. During this session, this clinician used the following therapeutic modalities: Bereavement Therapy, Client-centered Therapy, Solution-Focused Therapy, and Supportive Psychotherapy    Substance Abuse was not addressed during this session. If the client is diagnosed with a co-occurring substance use disorder, please indicate any changes in the frequency or amount of use: NA . Stage of change for addressing substance use diagnoses: No substance use/Not applicable    ASSESSMENT:  Annika Trotter presents with a Euthymic/ normal mood. her affect is Normal range and intensity, which is congruent, with her mood and the content of the session. The client has made progress on their goals. Annika Trotter presents with a none risk of suicide, none risk of self-harm, and none risk of harm to others. For any risk assessment that surpasses a "low" rating, a safety plan must be developed. A safety plan was indicated: no  If yes, describe in detail NA    PLAN: Between sessions, Annika Trotter will will continue to check in with her children--asking if they have any questions and honoring their father's memory when needed. At the next session, the therapist will use Client-centered Therapy to address ongoing stressors. Behavioral Health Treatment Plan and Discharge Planning: Annika Trotter is aware of and agrees to continue to work on their treatment plan.  They have identified and are working toward their discharge goals. yes    Visit start and stop times:    10/31/23  Start Time: 1109  Stop Time: 1159  Total Visit Time: 50 minutes  Virtual Regular Visit    Verification of patient location:    Patient is located at Home in the following state in which I hold an active license [place]      Assessment/Plan:    Problem List Items Addressed This Visit    None      Goals addressed in session: Goal 1          Reason for visit is No chief complaint on file. Encounter provider Allegra Polanco    Provider located at [address] [place] [id number] [phone]      Recent Visits  Date Type Provider Dept   10/31/23 Telemedicine [facility] [place] [place] [facility]   Showing recent visits within past 7 days and meeting all other requirements  Future Appointments  No visits were found meeting these conditions. Showing future appointments within next 150 days and meeting all other requirements       The patient was identified by name and date of birth. Marlys Brown was informed that this is a telemedicine visit and that the visit is being conducted throughthe [employer] platform. She agrees to proceed. .  My office door was closed. No one else was in the room. She acknowledged consent and understanding of privacy and security of the video platform. The patient has agreed to participate and understands they can discontinue the visit at any time. Patient is aware this is a billable service. Subjective  Marlys Brown is a 40 y.o. female  . HPI     No past medical history on file. No past surgical history on file. No current outpatient medications on file. No current facility-administered medications for this visit. Not on File    Review of Systems    Video Exam    There were no vitals filed for this visit.     Physical Exam

## 2023-11-06 ENCOUNTER — TELEMEDICINE (OUTPATIENT)
Dept: PSYCHIATRY | Facility: CLINIC | Age: 43
End: 2023-11-06
Payer: COMMERCIAL

## 2023-11-06 DIAGNOSIS — F41.1 GENERALIZED ANXIETY DISORDER: Primary | ICD-10-CM

## 2023-11-06 PROCEDURE — 90837 PSYTX W PT 60 MINUTES: CPT | Performed by: SOCIAL WORKER

## 2023-11-07 NOTE — PSYCH
Behavioral Health Psychotherapy Progress Note    Psychotherapy Provided: Individual Psychotherapy     1. Generalized anxiety disorder            Goals addressed in session: Goal 1     DATA: Clinician explored client's current stressors-- clinician and client discussed how she has grown in her independence; skills she has picked up in the absence of a male partner. During this session, this clinician used the following therapeutic modalities: Bereavement Therapy, Client-centered Therapy, and Supportive Psychotherapy    Substance Abuse was not addressed during this session. If the client is diagnosed with a co-occurring substance use disorder, please indicate any changes in the frequency or amount of use: NA. Stage of change for addressing substance use diagnoses: No substance use/Not applicable    ASSESSMENT:  Richard Blankenship presents with a Euthymic/ normal mood. her affect is Normal range and intensity, which is congruent, with her mood and the content of the session. The client has made progress on their goals. Richard Blankenship presents with a none risk of suicide, none risk of self-harm, and none risk of harm to others. For any risk assessment that surpasses a "low" rating, a safety plan must be developed. A safety plan was indicated: no  If yes, describe in detail NA    PLAN: Between sessions, Richard Blankenship will continue to utilize her resources for support. At the next session, the therapist will use Client-centered Therapy to address ongoing stressors. Behavioral Health Treatment Plan and Discharge Planning: Richard Blankenship is aware of and agrees to continue to work on their treatment plan. They have identified and are working toward their discharge goals.  yes    Visit start and stop times:    11/06/23  Start Time: 1304  Stop Time: 1402  Total Visit Time: 58 minutes  Virtual Regular Visit    Verification of patient location:    Patient is located at Home in the following state in which I hold an active license [place]      Assessment/Plan:    Problem List Items Addressed This Visit          Psychiatry Problems    Generalized anxiety disorder - Primary       Goals addressed in session: Goal 1          Reason for visit is No chief complaint on file. Encounter provider Rubi Lesser, [place]    Provider located at [address] [place] [id number] [phone]      Recent Visits  Date Type Provider Dept   11/06/23 [facility], [address] [place] [address] [facility]   10/31/23 Telemedicine [facility]   Showing recent visits within past 7 days and meeting all other requirements  Future Appointments  No visits were found meeting these conditions. Showing future appointments within next 150 days and meeting all other requirements       The patient was identified by name and date of birth. Kodak Samano was informed that this is a telemedicine visit and that the visit is being conducted throughthe [employer] platform. She agrees to proceed. .  My office door was closed. No one else was in the room. She acknowledged consent and understanding of privacy and security of the video platform. The patient has agreed to participate and understands they can discontinue the visit at any time. Patient is aware this is a billable service. Subjective  Kodak Samano is a 40 y.o. female  . HPI     No past medical history on file. No past surgical history on file. No current outpatient medications on file. No current facility-administered medications for this visit. Not on File    Review of Systems    Video Exam    There were no vitals filed for this visit.     Physical Exam

## 2023-11-14 ENCOUNTER — TELEMEDICINE (OUTPATIENT)
Dept: PSYCHIATRY | Facility: CLINIC | Age: 43
End: 2023-11-14
Payer: COMMERCIAL

## 2023-11-14 DIAGNOSIS — F43.10 PTSD (POST-TRAUMATIC STRESS DISORDER): ICD-10-CM

## 2023-11-14 DIAGNOSIS — F41.1 GENERALIZED ANXIETY DISORDER: Primary | ICD-10-CM

## 2023-11-14 PROCEDURE — 90834 PSYTX W PT 45 MINUTES: CPT | Performed by: SOCIAL WORKER

## 2023-11-14 NOTE — PSYCH
Behavioral Health Psychotherapy Progress Note    Psychotherapy Provided: Individual Psychotherapy     1. Generalized anxiety disorder        2. PTSD (post-traumatic stress disorder)            Goals addressed in session: Goal 2     DATA: Clinician explored client's current stressors-- discussed illness anxiety- -assisted client in recognizing the value in anxiety with life preservation; affirmed client's ability to identify and differentiate between pain that is worth visiting the emergency room. Clinician assisted client in recognizing the start of her illness anxiety and discussed self affirmation throughout her decision making. Clinician and client also discussed utilizing distraction and grounding when anxious. During this session, this clinician used the following therapeutic modalities: Client-centered Therapy, Cognitive Behavioral Therapy, Solution-Focused Therapy, and Supportive Psychotherapy    Substance Abuse was not addressed during this session. If the client is diagnosed with a co-occurring substance use disorder, please indicate any changes in the frequency or amount of use: NA. Stage of change for addressing substance use diagnoses: No substance use/Not applicable    ASSESSMENT:  Pa Huynh presents with a Anxious mood. her affect is Normal range and intensity and Tearful, which is congruent, with her mood and the content of the session. The client has made progress on their goals. Pa Huynh presents with a none risk of suicide, none risk of self-harm, and none risk of harm to others. For any risk assessment that surpasses a "low" rating, a safety plan must be developed. A safety plan was indicated: no  If yes, describe in detail NA    PLAN: Between sessions, Pa Huynh will practice grounding, distraction, and self affirmation. At the next session, the therapist will use Client-centered Therapy to address ongoing stressors.     Behavioral Health Treatment Plan and Discharge Planning: Mardee Najjar is aware of and agrees to continue to work on their treatment plan. They have identified and are working toward their discharge goals. yes    Visit start and stop times:    11/14/23  Start Time: 1308  Stop Time: 1400  Total Visit Time: 52 minutes  Virtual Regular Visit    Verification of patient location:    Patient is located at Home in the following state in which I hold an active license [place]      Assessment/Plan:    Problem List Items Addressed This Visit          Psychiatry Problems    Generalized anxiety disorder - Primary    PTSD (post-traumatic stress disorder)       Goals addressed in session: Goal 2          Reason for visit is No chief complaint on file. Encounter provider [place], [place]    Provider located at [address] [place] [address] [phone]      Recent Visits  No visits were found meeting these conditions. Showing recent visits within past 7 days and meeting all other requirements  Today's Visits  Date Type Provider Dept   11/14/23 Telemedicine [place], [place] [facility]   Showing today's visits and meeting all other requirements  Future Appointments  No visits were found meeting these conditions. Showing future appointments within next 150 days and meeting all other requirements       The patient was identified by name and date of birth. Mardee Najjar was informed that this is a telemedicine visit and that the visit is being conducted throughthe [employer] platform. She agrees to proceed. .  My office door was closed. No one else was in the room. She acknowledged consent and understanding of privacy and security of the video platform. The patient has agreed to participate and understands they can discontinue the visit at any time. Patient is aware this is a billable service.      Subjective  Mardee Najjar is a 40 y.o. female .      HPI     No past medical history on file. No past surgical history on file. No current outpatient medications on file. No current facility-administered medications for this visit. Not on File    Review of Systems    Video Exam    There were no vitals filed for this visit.     Physical Exam

## 2023-11-27 ENCOUNTER — TELEMEDICINE (OUTPATIENT)
Dept: PSYCHIATRY | Facility: CLINIC | Age: 43
End: 2023-11-27
Payer: COMMERCIAL

## 2023-11-27 DIAGNOSIS — F41.1 GENERALIZED ANXIETY DISORDER: ICD-10-CM

## 2023-11-27 DIAGNOSIS — F43.10 PTSD (POST-TRAUMATIC STRESS DISORDER): Primary | ICD-10-CM

## 2023-11-27 PROCEDURE — 90834 PSYTX W PT 45 MINUTES: CPT | Performed by: SOCIAL WORKER

## 2023-11-29 NOTE — PSYCH
Behavioral Health Psychotherapy Progress Note    Psychotherapy Provided: Individual Psychotherapy     1. PTSD (post-traumatic stress disorder)        2. Generalized anxiety disorder            Goals addressed in session: Goal 1     DATA: Clinician explored client's current stressors-- discussed the triggers of the holiday and her child's birthday. Clinician affirmed client's skills and her abilities to deescalate her panics and worries as she is experiencing them. During this session, this clinician used the following therapeutic modalities: Client-centered Therapy, Cognitive Behavioral Therapy, and Supportive Psychotherapy    Substance Abuse was not addressed during this session. If the client is diagnosed with a co-occurring substance use disorder, please indicate any changes in the frequency or amount of use: NA. Stage of change for addressing substance use diagnoses: No substance use/Not applicable    ASSESSMENT:  Jackquline Mcardle presents with a Euthymic/ normal mood. her affect is Normal range and intensity, which is congruent, with her mood and the content of the session. The client has made progress on their goals. Jackquline Mcardle presents with a none risk of suicide, none risk of self-harm, and none risk of harm to others. For any risk assessment that surpasses a "low" rating, a safety plan must be developed. A safety plan was indicated: no  If yes, describe in detail NA    PLAN: Between sessions, Jackquline Mcardle will continue to utilizing her coping skills. At the next session, the therapist will use Client-centered Therapy to address ongoing stressors. Behavioral Health Treatment Plan and Discharge Planning: Jackquline Mcardle is aware of and agrees to continue to work on their treatment plan. They have identified and are working toward their discharge goals.  yes    Visit start and stop times:    11/27/23  Start Time: 1209  Stop Time: 1301  Total Visit Time: 52 minutes  Virtual Regular Visit    Verification of patient location:    Patient is located at Home in the following state in which I hold an active license [place]      Assessment/Plan:    Problem List Items Addressed This Visit          Psychiatry Problems    Generalized anxiety disorder    PTSD (post-traumatic stress disorder) - Primary       Goals addressed in session: Goal 1          Reason for visit is No chief complaint on file. Encounter provider Allegra Goldstein    Provider located at [address] [place] [id number] [phone]      Recent Visits  Date Type Provider Dept   11/27/23 Telemedicine [facility] [place], [place] [facility]   Showing recent visits within past 7 days and meeting all other requirements  Future Appointments  No visits were found meeting these conditions. Showing future appointments within next 150 days and meeting all other requirements       The patient was identified by name and date of birth. Sonu Boggs was informed that this is a telemedicine visit and that the visit is being conducted throughthe [employer] platform. She agrees to proceed. .  My office door was closed. No one else was in the room. She acknowledged consent and understanding of privacy and security of the video platform. The patient has agreed to participate and understands they can discontinue the visit at any time. Patient is aware this is a billable service. Subjective  Sonu Boggs is a 40 y.o. female  . HPI     No past medical history on file. No past surgical history on file. No current outpatient medications on file. No current facility-administered medications for this visit. Not on File    Review of Systems    Video Exam    There were no vitals filed for this visit.     Physical Exam

## 2023-12-05 ENCOUNTER — TELEMEDICINE (OUTPATIENT)
Dept: PSYCHIATRY | Facility: CLINIC | Age: 43
End: 2023-12-05
Payer: COMMERCIAL

## 2023-12-05 DIAGNOSIS — F41.1 GENERALIZED ANXIETY DISORDER: ICD-10-CM

## 2023-12-05 DIAGNOSIS — F43.10 PTSD (POST-TRAUMATIC STRESS DISORDER): Primary | ICD-10-CM

## 2023-12-05 PROCEDURE — 90837 PSYTX W PT 60 MINUTES: CPT | Performed by: SOCIAL WORKER

## 2023-12-05 NOTE — PSYCH
Behavioral Health Psychotherapy Progress Note    Psychotherapy Provided: Individual Psychotherapy     No diagnosis found. Goals addressed in session: Goal 1     DATA: Cliniican explored client's current stressors--discussed her daughter's grieving process and the risk of an abusive relationship. Clinician encouraged client to invite her daughter to outings, not promising anything, but including her in things, but not guilt tripping. During this session, this clinician used the following therapeutic modalities: Bereavement Therapy, Client-centered Therapy, Solution-Focused Therapy, and Supportive Psychotherapy    Substance Abuse was not addressed during this session. If the client is diagnosed with a co-occurring substance use disorder, please indicate any changes in the frequency or amount of use: NA. Stage of change for addressing substance use diagnoses: No substance use/Not applicable    ASSESSMENT:  Jeni Terrazas presents with a Euthymic/ normal mood. her affect is Normal range and intensity, which is congruent, with her mood and the content of the session. The client has made progress on their goals. Jeni Terrazas presents with a none risk of suicide, none risk of self-harm, and none risk of harm to others. For any risk assessment that surpasses a "low" rating, a safety plan must be developed. A safety plan was indicated: no  If yes, describe in detail NA    PLAN: Between sessions, Jeni Terrazas will continue to help her children through grief. At the next session, the therapist will use Client-centered Therapy to address ongoing stressors. Behavioral Health Treatment Plan and Discharge Planning: Jeni Terrazas is aware of and agrees to continue to work on their treatment plan. They have identified and are working toward their discharge goals.  yes    Visit start and stop times:    12/05/23  Start Time: 5654  Virtual Regular Visit    Verification of patient location:    Patient is located at Home in the following state in which I hold an active license [place]      Assessment/Plan:    Problem List Items Addressed This Visit    None      Goals addressed in session: Goal 1          Reason for visit is No chief complaint on file. Encounter provider Allegra Antoine    Provider located at [address] [place] [address] Road [address] [phone]      Recent Visits  No visits were found meeting these conditions. Showing recent visits within past 7 days and meeting all other requirements  Today's Visits  Date Type Provider Dept   12/05/23 Telemedicine [facility] [place], [place] [facility]   Showing today's visits and meeting all other requirements  Future Appointments  No visits were found meeting these conditions. Showing future appointments within next 150 days and meeting all other requirements       The patient was identified by name and date of birth. Nury Pineda was informed that this is a telemedicine visit and that the visit is being conducted throughthe [employer] platform. She agrees to proceed. .  My office door was closed. No one else was in the room. She acknowledged consent and understanding of privacy and security of the video platform. The patient has agreed to participate and understands they can discontinue the visit at any time. Patient is aware this is a billable service. Subjective  Nury Pineda is a 40 y.o. female  . HPI     No past medical history on file. No past surgical history on file. No current outpatient medications on file. No current facility-administered medications for this visit. Not on File    Review of Systems    Video Exam    There were no vitals filed for this visit.     Physical Exam

## 2023-12-12 ENCOUNTER — TELEMEDICINE (OUTPATIENT)
Dept: PSYCHIATRY | Facility: CLINIC | Age: 43
End: 2023-12-12
Payer: COMMERCIAL

## 2023-12-12 DIAGNOSIS — F43.10 PTSD (POST-TRAUMATIC STRESS DISORDER): Primary | ICD-10-CM

## 2023-12-12 DIAGNOSIS — F41.1 GENERALIZED ANXIETY DISORDER: ICD-10-CM

## 2023-12-12 PROCEDURE — 90837 PSYTX W PT 60 MINUTES: CPT | Performed by: SOCIAL WORKER

## 2023-12-13 NOTE — PSYCH
Behavioral Health Psychotherapy Progress Note    Psychotherapy Provided: Individual Psychotherapy     1. PTSD (post-traumatic stress disorder)        2. Generalized anxiety disorder            Goals addressed in session: Goal 1     DATA:Clinician explored client's stressors--affirmed her ability to handle her triggers regarding her PTSD--discussed triggers that continue to show themselves throughout her life. Clinician explained the 5 steps of grief-- the stages that her daughter is going through-- and identifying progress within her children. Clinician and client discussed having a game night-- cultivating relationships between the children and strengthening communication skills between each other. During this session, this clinician used the following therapeutic modalities: Client-centered Therapy, Cognitive Behavioral Therapy, Solution-Focused Therapy, and Supportive Psychotherapy    Substance Abuse was not addressed during this session. If the client is diagnosed with a co-occurring substance use disorder, please indicate any changes in the frequency or amount of use: NA. Stage of change for addressing substance use diagnoses: No substance use/Not applicable    ASSESSMENT:  Wendelyn Halsted presents with a Euthymic/ normal and Anxious mood. her affect is Normal range and intensity and Tearful, which is congruent, with her mood and the content of the session. The client has made progress on their goals. Wendelyn Halsted presents with a none risk of suicide, none risk of self-harm, and none risk of harm to others. For any risk assessment that surpasses a "low" rating, a safety plan must be developed. A safety plan was indicated: no  If yes, describe in detail NA    PLAN: Between sessions, Wendelyn Halsted will continue to utilize her coping skills when triggered. At the next session, the therapist will use Client-centered Therapy to address ongoing stressors.     Behavioral Health Treatment Plan and Discharge Planning: James Callahan is aware of and agrees to continue to work on their treatment plan. They have identified and are working toward their discharge goals. yes    Visit start and stop times:    12/12/23  Start Time: 1205  Stop Time: 1304  Total Visit Time: 59 minutes  Virtual Regular Visit    Verification of patient location:    Patient is located at Home in the following state in which I hold an active license [place]      Assessment/Plan:    Problem List Items Addressed This Visit          Psychiatry Problems    Generalized anxiety disorder    PTSD (post-traumatic stress disorder) - Primary       Goals addressed in session: Goal 1          Reason for visit is No chief complaint on file. Encounter provider Allegra Vargas    Provider located at [address] [place] [id number] [phone]      Recent Visits  Date Type Provider Dept   12/12/23 Telemedicine [place], [place] [facility]   Showing recent visits within past 7 days and meeting all other requirements  Future Appointments  No visits were found meeting these conditions. Showing future appointments within next 150 days and meeting all other requirements       The patient was identified by name and date of birth. James Callahan was informed that this is a telemedicine visit and that the visit is being conducted throughthe [employer] platform. She agrees to proceed. .  My office door was closed. No one else was in the room. She acknowledged consent and understanding of privacy and security of the video platform. The patient has agreed to participate and understands they can discontinue the visit at any time. Patient is aware this is a billable service. Subjective  James Callahan is a 40 y.o. female  . HPI     No past medical history on file. No past surgical history on file.     No current outpatient medications on file.     No current facility-administered medications for this visit. Not on File    Review of Systems    Video Exam    There were no vitals filed for this visit.     Physical Exam

## 2024-01-03 ENCOUNTER — TELEMEDICINE (OUTPATIENT)
Dept: PSYCHIATRY | Facility: CLINIC | Age: 44
End: 2024-01-03
Payer: COMMERCIAL

## 2024-01-03 DIAGNOSIS — F43.10 PTSD (POST-TRAUMATIC STRESS DISORDER): Primary | ICD-10-CM

## 2024-01-03 DIAGNOSIS — F41.1 GENERALIZED ANXIETY DISORDER: ICD-10-CM

## 2024-01-03 PROCEDURE — 90837 PSYTX W PT 60 MINUTES: CPT | Performed by: SOCIAL WORKER

## 2024-01-03 NOTE — BH TREATMENT PLAN
"Outpatient Behavioral Health Psychotherapy Treatment Plan    Skye Smith  1980     Date of Initial Psychotherapy Assessment: 12/28/22   Date of Current Treatment Plan: 01/03/24  Treatment Plan Target Date: 1/03/25  Treatment Plan Expiration Date: 6/3/24    Diagnosis:   1. PTSD (post-traumatic stress disorder)        2. Generalized anxiety disorder              Area(s) of Need: I dont want to live in a state of anxiety.. every time I notice the progress- get angry at the entire situation-- feelings of guilt regarding staying, and anger/resentment toward ex for the experience..    Long Term Goal 1 (in the client's own words): I want to make peace with the situation for what it is now...     Stage of Change: Action    Target Date for completion: 6/26/24     Anticipated therapeutic modalities: berevement; compassion focused; trauma informed; strengths based     People identified to complete this goal: Client and clinician      Objective 1: (identify the means of measuring success in meeting the objective): Client will go through the 5 stages of grief regarding death of franko-- identifying these and processing each stage; client will allow herself to feel her feelings 5/5x; Client will practice forgiveness for herself--identifying at least 3 things she was held accountable for within her marriage-- and identifying reality 5/5x; Client will Client will practice positive self talk and will identify at least 3 personal strengths--will acknowledge the attributes that make her a \"good person\" client will practice mindfulness as she utilizes her strengths at least once a day.       Objective 2: (identify the means of measuring success in meeting the objective): NA      Long Term Goal 2 (in the client's own words): I want to prevent panic attacks.. I want to stop from becoming anxious     Stage of Change: Action    Target Date for completion: 6/26/24     Anticipated therapeutic modalities: somatic, mindfulness, " "breathwork, CBT, strengths based, exposure     People identified to complete this goal: Client and clinician      Objective 1: (identify the means of measuring success in meeting the objective): Client will practice her coping mechanisms 5/5x; client will continue to recognize avoidance and will continue to expose herself to anxiety inducing situations at least once a week;  Client will meditate once a day; Client will practice challenging negative core beliefs \" I am unworthy of love\" \" I am not enough\" daily. Client will process her fear of being alone-- client will identify AT LEAST 3 things what brings her comfort and peace; Client will identify at least 2 places that bring her peace, client will engage in self care at least once a week; Client  will practice placing her needs as a priority at least once a week;  Client will continue to be vulnerable with her children, her parents, and her partner at least once a week. client will practice utilizing her support system;      Objective 2: (identify the means of measuring success in meeting the objective): NA     Long Term Goal 3 (in the client's own words): NA    Stage of Change: Pre-contemplation    Target Date for completion: NA     Anticipated therapeutic modalities: NA     People identified to complete this goal: NA      Objective 1: (identify the means of measuring success in meeting the objective): NA      Objective 2: (identify the means of measuring success in meeting the objective): NA     I am currently under the care of a [facility] psychiatric provider: no    My [facility] psychiatric provider is: NA    I am currently taking psychiatric medications: No    I feel that I will be ready for discharge from mental health care when I reach the following (measurable goal/objective): When I dont have panic attacks in at least 90 consecutive days    For children and adults who have a legal guardian:   Has there been any change to custody orders and/or " guardianship status? NA. If yes, attach updated documentation.    I have created my Crisis Plan and have been offered a copy of this plan    Behavioral Health Treatment Plan [facility]: Diagnosis and Treatment Plan explained to Skye Smith acknowledges an understanding of their diagnosis. Skye Smith agrees to this treatment plan.    I have been offered a copy of this Treatment Plan. yes

## 2024-01-04 NOTE — PSYCH
"Behavioral Health Psychotherapy Progress Note    Psychotherapy Provided: Individual Psychotherapy     1. PTSD (post-traumatic stress disorder)        2. Generalized anxiety disorder            Goals addressed in session: Goal 1     DATA: Clinician explored client's current stressors-- discussed her progress-- her handling the balance of what needs to be done, and what can be sacrificed to make her life easier. Clinician assisted client in recognizing all of her progress.   During this session, this clinician used the following therapeutic modalities: Client-centered Therapy, Cognitive Behavioral Therapy, Solution-Focused Therapy, and Supportive Psychotherapy    Substance Abuse was not addressed during this session. If the client is diagnosed with a co-occurring substance use disorder, please indicate any changes in the frequency or amount of use: NA. Stage of change for addressing substance use diagnoses: No substance use/Not applicable    ASSESSMENT:  Skye Smith presents with a Euthymic/ normal mood.     her affect is Normal range and intensity, which is congruent, with her mood and the content of the session. The client has made progress on their goals.     Skye Smith presents with a none risk of suicide, none risk of self-harm, and none risk of harm to others.    For any risk assessment that surpasses a \"low\" rating, a safety plan must be developed.    A safety plan was indicated: no  If yes, describe in detail NA    PLAN: Between sessions, Skye Smith will continue to utilize her coping skills. At the next session, the therapist will use Client-centered Therapy to address ongoing stressors.    Behavioral Health Treatment Plan and Discharge Planning: Skye Smith is aware of and agrees to continue to work on their treatment plan. They have identified and are working toward their discharge goals. yes    Visit start and stop times:    01/03/24  Start Time: 1303  Stop Time: 1406  Total Visit Time: 63 " minutes  Virtual Regular Visit    Verification of patient location:    Patient is located at Home in the following state in which I hold an active license [place]      Assessment/Plan:    Problem List Items Addressed This Visit          Psychiatry Problems    Generalized anxiety disorder    PTSD (post-traumatic stress disorder) - Primary       Goals addressed in session: Goal 1          Reason for visit is No chief complaint on file.       Encounter provider [facility] ÁNGEL Rodas    [facility]  [facility] [place] [address] [phone]      Recent Visits  Date Type Provider Dept   01/03/24 Telemedicine [facility]   Showing recent visits within past 7 days and meeting all other requirements  Future Appointments  No visits were found meeting these conditions.  Showing future appointments within next 150 days and meeting all other requirements       The patient was identified by name and date of birth. Skye Smith was informed that this is a telemedicine visit and that the visit is being conducted throughthe [email or web address] platform. She agrees to proceed..  My office door was closed. No one else was in the room.  She acknowledged consent and understanding of privacy and security of the video platform. The patient has agreed to participate and understands they can discontinue the visit at any time.    Patient is aware this is a billable service.     Subjective  Skye Smith is a 43 y.o. female  .      HPI     No past medical history on file.    No past surgical history on file.    No current outpatient medications on file.     No current facility-administered medications for this visit.        Not on File    Review of Systems    Video Exam    There were no vitals filed for this visit.    Physical Exam

## 2024-01-09 ENCOUNTER — TELEMEDICINE (OUTPATIENT)
Dept: PSYCHIATRY | Facility: CLINIC | Age: 44
End: 2024-01-09
Payer: COMMERCIAL

## 2024-01-09 DIAGNOSIS — F41.1 GENERALIZED ANXIETY DISORDER: ICD-10-CM

## 2024-01-09 DIAGNOSIS — F43.10 PTSD (POST-TRAUMATIC STRESS DISORDER): Primary | ICD-10-CM

## 2024-01-09 PROCEDURE — 90837 PSYTX W PT 60 MINUTES: CPT | Performed by: SOCIAL WORKER

## 2024-01-09 NOTE — PSYCH
"Behavioral Health Psychotherapy Progress Note    Psychotherapy Provided: Individual Psychotherapy     No diagnosis found.    Goals addressed in session: Goal 1     DATA: Clinician explored client's current stressors-- assisted client in recognizing obsessive thoughts of her temperature and the compulsion to check her temperature. Clinician and client identified the thermometer as a trigger for her of her panic attacks and discussed the purposes of mild fevers. Client committed to not checking her temperature unless absolutely necessary--identified when it is necessary to check--ways to get around it. Assessing her body for her physical needs.   During this session, this clinician used the following therapeutic modalities: Client-centered Therapy, Cognitive Behavioral Therapy, Mindfulness-based Strategies, Supportive Psychotherapy, and Exposure response prevention    Substance Abuse was not addressed during this session. If the client is diagnosed with a co-occurring substance use disorder, please indicate any changes in the frequency or amount of use: NA. Stage of change for addressing substance use diagnoses: No substance use/Not applicable    ASSESSMENT:  Skye Smith presents with a Euthymic/ normal mood.     her affect is Normal range and intensity, which is congruent, with her mood and the content of the session. The client has made progress on their goals.     Skye Smith presents with a none risk of suicide, none risk of self-harm, and none risk of harm to others.    For any risk assessment that surpasses a \"low\" rating, a safety plan must be developed.    A safety plan was indicated: no  If yes, describe in detail NA    PLAN: Between sessions, Skye Smith will practice resisting her compulsions. At the next session, the therapist will use Client-centered Therapy to address ongoing stressors.    Behavioral Health Treatment Plan and Discharge Planning: Skye Smith is aware of and agrees to continue to work " on their treatment plan. They have identified and are working toward their discharge goals. yes    Visit start and stop times:    01/09/24  Start Time: 1203  Stop Time: 1303  Total Visit Time: 60 minutes  Virtual Regular Visit    Verification of patient location:    Patient is located at Home in the following state in which I hold an active license [place]      Assessment/Plan:    Problem List Items Addressed This Visit    None      Goals addressed in session: Goal 1          Reason for visit is No chief complaint on file.       Encounter provider Aerial Misael, LCSW    [facility] [facility] [place] [address]  [phone]      Recent Visits  Date Type Provider Dept   01/03/24 Telemedicine Aerial Misael, LCSW [facility]   Showing recent visits within past 7 days and meeting all other requirements  Today's Visits  Date Type Provider Dept   01/09/24 Telemedicine Aerial Misael, LCSW [facility]   Showing today's visits and meeting all other requirements  Future Appointments  No visits were found meeting these conditions.  Showing future appointments within next 150 days and meeting all other requirements       The patient was identified by name and date of birth. Skye Smith was informed that this is a telemedicine visit and that the visit is being conducted throughthe [employer] Chip platform. She agrees to proceed..  My office door was closed. No one else was in the room.  She acknowledged consent and understanding of privacy and security of the video platform. The patient has agreed to participate and understands they can discontinue the visit at any time.    Patient is aware this is a billable service.     Subjective  Skye Smith is a 43 y.o. female  .      HPI     No past medical history on file.    No past surgical history on file.    No current outpatient medications on  file.     No current facility-administered medications for this visit.        Not on File    Review of Systems    Video Exam    There were no vitals filed for this visit.    Physical Exam

## 2024-01-16 ENCOUNTER — TELEMEDICINE (OUTPATIENT)
Dept: PSYCHIATRY | Facility: CLINIC | Age: 44
End: 2024-01-16
Payer: COMMERCIAL

## 2024-01-16 DIAGNOSIS — F43.10 PTSD (POST-TRAUMATIC STRESS DISORDER): Primary | ICD-10-CM

## 2024-01-16 DIAGNOSIS — F41.1 GENERALIZED ANXIETY DISORDER: ICD-10-CM

## 2024-01-16 PROCEDURE — 90837 PSYTX W PT 60 MINUTES: CPT | Performed by: SOCIAL WORKER

## 2024-01-16 NOTE — PSYCH
Virtual Regular Visit    Verification of patient location:    Patient is located at {[facility] Virtual Patient Location:[address]} in the following state in which I hold an active license {[facility] virtual patient location:[address]}      Assessment/Plan:    Problem List Items Addressed This Visit        Psychiatry Problems    Generalized anxiety disorder    PTSD (post-traumatic stress disorder) - Primary       Goals addressed in session: {GOALS:53701}          Reason for visit is   Chief Complaint   Patient presents with   • Virtual Regular Visit          Encounter provider Aerial Misael, LCSW    Provider [facility]  [facility]  [place] [address] [phone]      Recent Visits  Date Type Provider Dept   01/09/24 Telemedicine Aerial Misael, LCSW [facility]   Showing recent visits within past 7 days and meeting all other requirements  Today's Visits  Date Type Provider Dept   01/16/24 Telemedicine Aerial Misael, LCSW [facility]   Showing today's visits and meeting all other requirements  Future Appointments  No visits were found meeting these conditions.  Showing future appointments within next 150 days and meeting all other requirements       The patient was identified by name and date of birth. Skye Smith was informed that this is a telemedicine visit and that the visit is being conducted through{AMB VIRTUAL VISIT MEDIUM:24540}.  {Telemedicine confidentiality :89826} {Telemedicine participants:65560}  She acknowledged consent and understanding of privacy and security of the video platform. The patient has agreed to participate and understands they can discontinue the visit at any time.    Patient is aware this is a billable service.     Subjective  Skye Smith is a 43 y.o. female *** .      HPI     No past medical history on file.    No past surgical history on file.    No current  outpatient medications on file.     No current facility-administered medications for this visit.        Not on File    Review of Systems    Video Exam    There were no vitals filed for this visit.    Physical Exam     Visit Time    Visit Start Time: ***  Visit Stop Time: ***  Total Visit Duration: {Psych Total Visit Time:86188}

## 2024-01-16 NOTE — PSYCH
"Behavioral Health Psychotherapy Progress Note    Psychotherapy Provided: Individual Psychotherapy     1. PTSD (post-traumatic stress disorder)        2. Generalized anxiety disorder            Goals addressed in session: Goal 1     DATA: Clinician explored client's current stressors--discussed her overwhelming weekend and affirmed how she handled it--utilizing her resources and recognizing how mental illness presents itself. Clinician and client discussed her nightmares and the triggers of them--when she is reminded her ex  is gone.   During this session, this clinician used the following therapeutic modalities: Client-centered Therapy, Cognitive Behavioral Therapy, Solution-Focused Therapy, and Supportive Psychotherapy    Substance Abuse was not addressed during this session. If the client is diagnosed with a co-occurring substance use disorder, please indicate any changes in the frequency or amount of use: NA. Stage of change for addressing substance use diagnoses: No substance use/Not applicable    ASSESSMENT:  Skye Smith presents with a Euthymic/ normal and Dysthymic mood.     her affect is Normal range and intensity, which is congruent, with her mood and the content of the session. The client has made progress on their goals.     Skye Smith presents with a none risk of suicide, none risk of self-harm, and none risk of harm to others.    For any risk assessment that surpasses a \"low\" rating, a safety plan must be developed.    A safety plan was indicated: no  If yes, describe in detail NA    PLAN: Between sessions, Skye Smith will continue to utilize her coping skills. At the next session, the therapist will use Client-centered Therapy to address ongoing stressors.    Behavioral Health Treatment Plan and Discharge Planning: Skye Smith is aware of and agrees to continue to work on their treatment plan. They have identified and are working toward their discharge goals. yes    Visit start and stop " times:    01/16/24  Start Time: 1205  Stop Time: 1300  Total Visit Time: 55 minutes  Virtual Regular Visit    Verification of patient location:    Patient is located at Home in the following state in which I hold an active license [place]      Assessment/Plan:    Problem List Items Addressed This Visit          Psychiatry Problems    Generalized anxiety disorder    PTSD (post-traumatic stress disorder) - Primary       Goals addressed in session: Goal 1          Reason for visit is   Chief Complaint   Patient presents with    Virtual Regular Visit        Encounter provider Aerial Misael, LCSW    Provider [facility]  [facility]  [place] [address]  [phone]      Recent Visits  Date Type Provider Dept   01/09/24 Telemedicine Aerial Misael, LCSW [facility]   Showing recent visits within past 7 days and meeting all other requirements  Today's Visits  Date Type Provider Dept   01/16/24 Telemedicine Aerial Misael, LCSW [facility]   Showing today's visits and meeting all other requirements  Future Appointments  No visits were found meeting these conditions.  Showing future appointments within next 150 days and meeting all other requirements       The patient was identified by name and date of birth. Skye Smith was informed that this is a telemedicine visit and that the visit is being conducted throughthe LetGive platform. She agrees to proceed..  My office door was closed. No one else was in the room.  She acknowledged consent and understanding of privacy and security of the video platform. The patient has agreed to participate and understands they can discontinue the visit at any time.    Patient is aware this is a billable service.     Subjective  Skye Smith is a 43 y.o. female  .      HPI     No past medical history on file.    No past surgical history on file.    No current  outpatient medications on file.     No current facility-administered medications for this visit.        Not on File    Review of Systems    Video Exam    There were no vitals filed for this visit.    Physical Exam

## 2024-01-23 ENCOUNTER — TELEMEDICINE (OUTPATIENT)
Dept: PSYCHIATRY | Facility: CLINIC | Age: 44
End: 2024-01-23
Payer: COMMERCIAL

## 2024-01-23 DIAGNOSIS — F41.1 GENERALIZED ANXIETY DISORDER: ICD-10-CM

## 2024-01-23 DIAGNOSIS — F43.10 PTSD (POST-TRAUMATIC STRESS DISORDER): Primary | ICD-10-CM

## 2024-01-23 PROCEDURE — 90837 PSYTX W PT 60 MINUTES: CPT | Performed by: SOCIAL WORKER

## 2024-01-23 NOTE — PSYCH
"Behavioral Health Psychotherapy Progress Note    Psychotherapy Provided: Individual Psychotherapy     No diagnosis found.    Goals addressed in session: Goal 1     DATA: Clinician explored client's current stressors-discussed triggers within her children-- deep breathing and hobbies she can use to unwind. Clinician and client discussed motivation-- as it wavers and how she needs to identify clear reasons to cooperate with commitments.   During this session, this clinician used the following therapeutic modalities: Client-centered Therapy, Cognitive Behavioral Therapy, Solution-Focused Therapy, and Supportive Psychotherapy    Substance Abuse was not addressed during this session. If the client is diagnosed with a co-occurring substance use disorder, please indicate any changes in the frequency or amount of use: NA. Stage of change for addressing substance use diagnoses: No substance use/Not applicable    ASSESSMENT:  Skye Smith presents with a Euthymic/ normal and Anxious mood.     her affect is Normal range and intensity and Tearful, which is congruent, with her mood and the content of the session. The client has made progress on their goals.     Skye Smith presents with a none risk of suicide, none risk of self-harm, and none risk of harm to others.    For any risk assessment that surpasses a \"low\" rating, a safety plan must be developed.    A safety plan was indicated: no  If yes, describe in detail NA    PLAN: Between sessions, Skye Smith will continue to utilize her coping skills. At the next session, the therapist will use Client-centered Therapy to address ongoing stressors.    Behavioral Health Treatment Plan and Discharge Planning: Skye Smith is aware of and agrees to continue to work on their treatment plan. They have identified and are working toward their discharge goals. yes    Visit start and stop times:    01/23/24  Start Time: 1202  Stop Time: 1303  Total Visit Time: 61 minutes  Virtual " Regular Visit    Verification of patient location:    Patient is located at Home in the following state in which I hold an active license [place]      Assessment/Plan:    Problem List Items Addressed This Visit    None      Goals addressed in session: Goal 1          Reason for visit is No chief complaint on file.       Encounter provider Aerial ÁNGEL Douglas    Provider [facility]  [facility]  [facility] [place] [address] [phone]      Recent Visits  Date Type Provider Dept   01/16/24 Telemedicine Aerial Misael, ÁNGEL [facility]   Showing recent visits within past 7 days and meeting all other requirements  Today's Visits  Date Type Provider Dept   01/23/24 Telemedicine Aerial Misael, WYATTW [facility]   Showing today's visits and meeting all other requirements  Future Appointments  No visits were found meeting these conditions.  Showing future appointments within next 150 days and meeting all other requirements       The patient was identified by name and date of birth. Skye Smith was informed that this is a telemedicine visit and that the visit is being conducted throughthe [employer] TV platform. She agrees to proceed..  My office door was closed. No one else was in the room.  She acknowledged consent and understanding of privacy and security of the video platform. The patient has agreed to participate and understands they can discontinue the visit at any time.    Patient is aware this is a billable service.     Subjective  Skye Smith is a 43 y.o. female  .      HPI     No past medical history on file.    No past surgical history on file.    No current outpatient medications on file.     No current facility-administered medications for this visit.        Not on File    Review of Systems    Video Exam    There were no vitals filed for this visit.    Physical Exam

## 2024-01-30 ENCOUNTER — TELEMEDICINE (OUTPATIENT)
Dept: PSYCHIATRY | Facility: CLINIC | Age: 44
End: 2024-01-30

## 2024-01-30 DIAGNOSIS — F43.10 PTSD (POST-TRAUMATIC STRESS DISORDER): Primary | ICD-10-CM

## 2024-02-01 NOTE — PSYCH
"Behavioral Health Psychotherapy Progress Note    Psychotherapy Provided: Individual Psychotherapy     1. PTSD (post-traumatic stress disorder)            Goals addressed in session: Goal 1     DATA: Clinician explored client's feelings regarding her boyfriend moving into her home. Clinician and client discussed the positive outlook, the risk/consequences of her children becoming attached. Clinician encouraged client to continue journaling about what she wants and making decisions based on her own feelings and thought processes.   During this session, this clinician used the following therapeutic modalities: Client-centered Therapy, Cognitive Behavioral Therapy, Solution-Focused Therapy, and Supportive Psychotherapy    Substance Abuse was not addressed during this session. If the client is diagnosed with a co-occurring substance use disorder, please indicate any changes in the frequency or amount of use: NA. Stage of change for addressing substance use diagnoses: No substance use/Not applicable    ASSESSMENT:  Skye Smith presents with a Euthymic/ normal mood.     her affect is Normal range and intensity, which is congruent, with her mood and the content of the session. The client has made progress on their goals.     Skye Smith presents with a none risk of suicide, none risk of self-harm, and none risk of harm to others.    For any risk assessment that surpasses a \"low\" rating, a safety plan must be developed.    A safety plan was indicated: no  If yes, describe in detail NA    PLAN: Between sessions, Skye Smith will continue journaling her feelings and thoughts. At the next session, the therapist will use Client-centered Therapy to address ongoing stressors.    Behavioral Health Treatment Plan and Discharge Planning: Skye Smith is aware of and agrees to continue to work on their treatment plan. They have identified and are working toward their discharge goals. yes    Visit start and stop " times:    01/30/24  Start Time: 1200  Stop Time: 1309  Total Visit Time: 69 minutes  Virtual Regular Visit    Verification of patient location:    Patient is located at Home in the following state in which I hold an active license [place]      Assessment/Plan:    Problem List Items Addressed This Visit          Psychiatry Problems    PTSD (post-traumatic stress disorder) - Primary       Goals addressed in session: Goal 1          Reason for visit is No chief complaint on file.       Encounter provider Aerial ÁNGEL Douglas    [facility]  [place] [address]  [phone]      Recent Visits  Date Type Provider Dept   01/30/24 Telemedicine Aerial ÁNGEL Douglas [facility]   Showing recent visits within past 7 days and meeting all other requirements  Future Appointments  No visits were found meeting these conditions.  Showing future appointments within next 150 days and meeting all other requirements       The patient was identified by name and date of birth. Skye Smith was informed that this is a telemedicine visit and that the visit is being conducted throughthe Hoonto platform. She agrees to proceed..  My office door was closed. No one else was in the room.  She acknowledged consent and understanding of privacy and security of the video platform. The patient has agreed to participate and understands they can discontinue the visit at any time.    Patient is aware this is a billable service.     Subjective  Skye Smith is a 43 y.o. female  .      HPI     No past medical history on file.    No past surgical history on file.    No current outpatient medications on file.     No current facility-administered medications for this visit.        Not on File    Review of Systems    Video Exam    There were no vitals filed for this visit.    Physical Exam

## 2024-02-13 ENCOUNTER — TELEMEDICINE (OUTPATIENT)
Dept: PSYCHIATRY | Facility: CLINIC | Age: 44
End: 2024-02-13

## 2024-02-13 DIAGNOSIS — F41.1 GENERALIZED ANXIETY DISORDER: ICD-10-CM

## 2024-02-13 DIAGNOSIS — F43.10 PTSD (POST-TRAUMATIC STRESS DISORDER): Primary | ICD-10-CM

## 2024-02-15 NOTE — PSYCH
"Behavioral Health Psychotherapy Progress Note    Psychotherapy Provided: Individual Psychotherapy     No diagnosis found.    Goals addressed in session: Goal 1     DATA: Clinician explored client's current stressors-- validated client's support to her children and discussed the difficulties of adjusting to news regarding her child's sexuality. Clinician and client encouraged client to reaffirm herself as she often does her children.   During this session, this clinician used the following therapeutic modalities: Client-centered Therapy, Cognitive Behavioral Therapy, Solution-Focused Therapy, and Supportive Psychotherapy    Substance Abuse was not addressed during this session. If the client is diagnosed with a co-occurring substance use disorder, please indicate any changes in the frequency or amount of use: NA. Stage of change for addressing substance use diagnoses: No substance use/Not applicable    ASSESSMENT:  Skye Smith presents with a Euthymic/ normal mood.     her affect is Normal range and intensity, which is congruent, with her mood and the content of the session. The client has made progress on their goals.     Skye Smith presents with a none risk of suicide, none risk of self-harm, and none risk of harm to others.    For any risk assessment that surpasses a \"low\" rating, a safety plan must be developed.    A safety plan was indicated: no  If yes, describe in detail NA    PLAN: Between sessions, Skye Smith will continue to reframe negative thoughts. At the next session, the therapist will use Client-centered Therapy to address ongoing stressors.    Behavioral Health Treatment Plan and Discharge Planning: Skye Smith is aware of and agrees to continue to work on their treatment plan. They have identified and are working toward their discharge goals. yes    Visit start and stop times:    02/13/24  Start Time: 1200  Stop Time: 1301  Total Visit Time: 61 minutes  Virtual Regular Visit    Verification of " patient location:    Patient is located at Home in the following state in which I hold an active license [place]      Assessment/Plan:    Problem List Items Addressed This Visit    None      Goals addressed in session: Goal 1          Reason for visit is No chief complaint on file.       Encounter provider Aerial ÁNGEL Douglas    Provider [facility] [place] [address] [place] [id number] [phone]      Recent Visits  Date Type Provider Dept   02/13/24 Telemedicine Aerial ÁNGEL Douglas [facility]   Showing recent visits within past 7 days and meeting all other requirements  Future Appointments  No visits were found meeting these conditions.  Showing future appointments within next 150 days and meeting all other requirements       The patient was identified by name and date of birth. Skye Smith was informed that this is a telemedicine visit and that the visit is being conducted throughthe Abril platform. She agrees to proceed..  My office door was closed. No one else was in the room.  She acknowledged consent and understanding of privacy and security of the video platform. The patient has agreed to participate and understands they can discontinue the visit at any time.    Patient is aware this is a billable service.     Subjective  Skye Smith is a 43 y.o. female  .      HPI     No past medical history on file.    No past surgical history on file.    No current outpatient medications on file.     No current facility-administered medications for this visit.        Not on File    Review of Systems    Video Exam    There were no vitals filed for this visit.    Physical Exam

## 2024-02-20 ENCOUNTER — TELEMEDICINE (OUTPATIENT)
Dept: PSYCHIATRY | Facility: CLINIC | Age: 44
End: 2024-02-20

## 2024-02-20 DIAGNOSIS — F41.1 GENERALIZED ANXIETY DISORDER: ICD-10-CM

## 2024-02-20 DIAGNOSIS — F43.10 PTSD (POST-TRAUMATIC STRESS DISORDER): Primary | ICD-10-CM

## 2024-02-20 NOTE — PSYCH
"Behavioral Health Psychotherapy Progress Note    Psychotherapy Provided: Individual Psychotherapy     No diagnosis found.    Goals addressed in session: Goal 1     DATA: Clinician explored client's current stressors--discussed burn out of being sick-- encouraged client to take It one day at a time.. Clinician assisted client in recognizing ways that her anxiety permeates her life when making decisions... clinician encouraged client to trust herself and to give her partner more credit.   During this session, this clinician used the following therapeutic modalities: Client-centered Therapy, Cognitive Behavioral Therapy, and Supportive Psychotherapy    Substance Abuse was not addressed during this session. If the client is diagnosed with a co-occurring substance use disorder, please indicate any changes in the frequency or amount of use: NA. Stage of change for addressing substance use diagnoses: No substance use/Not applicable    ASSESSMENT:  Skye Smith presents with a Euthymic/ normal and Anxious mood.     her affect is Normal range and intensity and Tearful, which is congruent, with her mood and the content of the session. The client has made progress on their goals.     Skye Smith presents with a none risk of suicide, none risk of self-harm, and none risk of harm to others.    For any risk assessment that surpasses a \"low\" rating, a safety plan must be developed.    A safety plan was indicated: no  If yes, describe in detail NA    PLAN: Between sessions, Skye Smith will make a list of all the adjectives she can think to describe her partner for review when anxious. At the next session, the therapist will use Client-centered Therapy to address ongoing stressors.    Behavioral Health Treatment Plan and Discharge Planning: Skye Smith is aware of and agrees to continue to work on their treatment plan. They have identified and are working toward their discharge goals. yes    Visit start and stop " times:  02/20/24  Start Time: 1204  Stop Time: 1301  Total Visit Time: 57 minutes  Virtual Regular Visit    Verification of patient location:    Patient is located at Home in the following state in which I hold an active license [place]      Assessment/Plan:    Problem List Items Addressed This Visit    None      Goals addressed in session: Goal 1          Reason for visit is No chief complaint on file.       Encounter provider Aerial Misael, LCSW    [facility]  [facility] [place] [id number] [phone]      Recent Visits  Date Type Provider Dept   02/13/24 Telemedicine Aerial Misael, LCSW [facility]   Showing recent visits within past 7 days and meeting all other requirements  Today's Visits  Date Type Provider Dept   02/20/24 Telemedicine Aerial Misael, LCSW [facility]   Showing today's visits and meeting all other requirements  Future Appointments  No visits were found meeting these conditions.  Showing future appointments within next 150 days and meeting all other requirements       The patient was identified by name and date of birth. Skye Smith was informed that this is a telemedicine visit and that the visit is being conducted throughthe [employer] platform. She agrees to proceed..  My office door was closed. No one else was in the room.  She acknowledged consent and understanding of privacy and security of the video platform. The patient has agreed to participate and understands they can discontinue the visit at any time.    Patient is aware this is a billable service.     Subjective  Skye Smith is a 43 y.o. female  .      HPI     No past medical history on file.    No past surgical history on file.    No current outpatient medications on file.     No current facility-administered medications for this visit.        Not on File    Review of Systems    Video  Exam    There were no vitals filed for this visit.    Physical Exam

## 2024-02-27 ENCOUNTER — TELEMEDICINE (OUTPATIENT)
Dept: PSYCHIATRY | Facility: CLINIC | Age: 44
End: 2024-02-27

## 2024-02-27 DIAGNOSIS — F43.10 PTSD (POST-TRAUMATIC STRESS DISORDER): Primary | ICD-10-CM

## 2024-02-27 NOTE — PSYCH
"Behavioral Health Psychotherapy Progress Note    Psychotherapy Provided: Individual Psychotherapy     No diagnosis found.    Goals addressed in session: Goal 1     DATA: Clinician explored client's current stressors-- discussed utilizing her support system so as to avoid feeling overwhelmed.. Clinician explored client's physiological responses and discussed how memory decompensates overtime- clinician encouraged client to stick to the facts, validating her family's memories without shaming their father.   During this session, this clinician used the following therapeutic modalities: Client-centered Therapy, Cognitive Behavioral Therapy, Solution-Focused Therapy, and Supportive Psychotherapy    Substance Abuse was not addressed during this session. If the client is diagnosed with a co-occurring substance use disorder, please indicate any changes in the frequency or amount of use: NA. Stage of change for addressing substance use diagnoses: No substance use/Not applicable    ASSESSMENT:  Skye Smith presents with a Euthymic/ normal mood.     her affect is Normal range and intensity, which is congruent, with her mood and the content of the session. The client has made progress on their goals.     Skye Smith presents with a none risk of suicide, none risk of self-harm, and none risk of harm to others.    For any risk assessment that surpasses a \"low\" rating, a safety plan must be developed.    A safety plan was indicated: no  If yes, describe in detail NA    PLAN: Between sessions, Skye Smith will continue to practice self care. At the next session, the therapist will use Client-centered Therapy to address ongoing stressors.    Behavioral Health Treatment Plan and Discharge Planning: Skye Smith is aware of and agrees to continue to work on their treatment plan. They have identified and are working toward their discharge goals. yes    Visit start and stop times:    02/27/24  Start Time: 1212  Stop Time: 1301  Total " Visit Time: 49 minutes  Virtual Regular Visit    Verification of patient location:    Patient is located at Home in the following state in which I hold an active license [place]      Assessment/Plan:    Problem List Items Addressed This Visit    None      Goals addressed in session: Goal 1          Reason for visit is No chief complaint on file.       Encounter provider Aerial Misael, LCSW    Provider [facility]  [facility]  [address] [place] [address] RD  [place] [address] [phone]      Recent Visits  Date Type Provider Dept   02/20/24 Telemedicine Aerial Misael, LCSW [facility]   Showing recent visits within past 7 days and meeting all other requirements  Today's Visits  Date Type Provider Dept   02/27/24 Telemedicine Aerial Misael, LCSW [facility]   Showing today's visits and meeting all other requirements  Future Appointments  No visits were found meeting these conditions.  Showing future appointments within next 150 days and meeting all other requirements       The patient was identified by name and date of birth. Skye Smith was informed that this is a telemedicine visit and that the visit is being conducted throughthe [employer] [employer] platform. She agrees to proceed..  My office door was closed. No one else was in the room.  She acknowledged consent and understanding of privacy and security of the video platform. The patient has agreed to participate and understands they can discontinue the visit at any time.    Patient is aware this is a billable service.     Subjective  Skye Smith is a 43 y.o. female  .      HPI     No past medical history on file.    No past surgical history on file.    No current outpatient medications on file.     No current facility-administered medications for this visit.        Not on File    Review of Systems    Video Exam    There were no vitals filed for this visit.    Physical  Exam

## 2024-03-05 ENCOUNTER — TELEMEDICINE (OUTPATIENT)
Dept: PSYCHIATRY | Facility: CLINIC | Age: 44
End: 2024-03-05

## 2024-03-05 DIAGNOSIS — F43.10 PTSD (POST-TRAUMATIC STRESS DISORDER): Primary | ICD-10-CM

## 2024-03-05 DIAGNOSIS — F41.1 GENERALIZED ANXIETY DISORDER: ICD-10-CM

## 2024-03-05 NOTE — PSYCH
"Behavioral Health Psychotherapy Progress Note    Psychotherapy Provided: Individual Psychotherapy     1. PTSD (post-traumatic stress disorder)        2. Generalized anxiety disorder            Goals addressed in session: Goal 1     DATA: Clinician explored client's current stressors-- discussed repetitive dreams of ex --discussed triggers, feelings of anxiety in the morning; managing conflicting personalities within her children. Clinician and client discussed validating her emotions and assessing her body for what she needs.   During this session, this clinician used the following therapeutic modalities: Client-centered Therapy, Cognitive Behavioral Therapy, Solution-Focused Therapy, and Supportive Psychotherapy    Substance Abuse was not addressed during this session. If the client is diagnosed with a co-occurring substance use disorder, please indicate any changes in the frequency or amount of use: NA. Stage of change for addressing substance use diagnoses: No substance use/Not applicable    ASSESSMENT:  Skye Smith presents with a Euthymic/ normal mood.     her affect is Normal range and intensity, which is congruent, with her mood and the content of the session. The client has made progress on their goals.     Skye Smith presents with a none risk of suicide, none risk of self-harm, and none risk of harm to others.    For any risk assessment that surpasses a \"low\" rating, a safety plan must be developed.    A safety plan was indicated: no  If yes, describe in detail NA    PLAN: Between sessions, Skye Smith will continue to utilize her coping skills. At the next session, the therapist will use Client-centered Therapy to address ongoing stressors.    Behavioral Health Treatment Plan and Discharge Planning: Skye Smith is aware of and agrees to continue to work on their treatment plan. They have identified and are working toward their discharge goals. yes    Visit start and stop " times:    03/05/24  Start Time: 1203  Stop Time: 1252  Total Visit Time: 49 minutes  Virtual Regular Visit    Verification of patient location:    Patient is located at Home in the following state in which I hold an active license [place]      Assessment/Plan:    Problem List Items Addressed This Visit          Psychiatry Problems    Generalized anxiety disorder    PTSD (post-traumatic stress disorder) - Primary       Goals addressed in session: Goal 1          Reason for visit is   Chief Complaint   Patient presents with    Virtual Regular Visit        Encounter provider Aerial ÁNGEL Douglas    [facility] [place] [address]  [phone]      Recent Visits  Date Type Provider Dept   03/05/24 Telemedicine Aerial ÁNGEL Douglas [facility]   Showing recent visits within past 7 days and meeting all other requirements  Future Appointments  No visits were found meeting these conditions.  Showing future appointments within next 150 days and meeting all other requirements       The patient was identified by name and date of birth. Skye Smith was informed that this is a telemedicine visit and that the visit is being conducted throughthe [employer] platform. She agrees to proceed..  My office door was closed. No one else was in the room.  She acknowledged consent and understanding of privacy and security of the video platform. The patient has agreed to participate and understands they can discontinue the visit at any time.    Patient is aware this is a billable service.     Subjective  Skye Smith is a 43 y.o. female  .      HPI     No past medical history on file.    No past surgical history on file.    No current outpatient medications on file.     No current facility-administered medications for this visit.        Not on File    Review of Systems    Video Exam    There were no vitals filed  for this visit.    Physical Exam

## 2024-03-12 ENCOUNTER — TELEMEDICINE (OUTPATIENT)
Dept: PSYCHIATRY | Facility: CLINIC | Age: 44
End: 2024-03-12

## 2024-03-12 DIAGNOSIS — F41.1 GENERALIZED ANXIETY DISORDER: ICD-10-CM

## 2024-03-12 DIAGNOSIS — F43.10 PTSD (POST-TRAUMATIC STRESS DISORDER): Primary | ICD-10-CM
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
## 2024-03-19 ENCOUNTER — TELEMEDICINE (OUTPATIENT)
Dept: PSYCHIATRY | Facility: CLINIC | Age: 44
End: 2024-03-19

## 2024-03-19 DIAGNOSIS — F41.1 GENERALIZED ANXIETY DISORDER: ICD-10-CM

## 2024-03-19 DIAGNOSIS — F43.10 PTSD (POST-TRAUMATIC STRESS DISORDER): Primary | ICD-10-CM

## 2024-03-19 NOTE — PSYCH
"Behavioral Health Psychotherapy Progress Note    Psychotherapy Provided: Individual Psychotherapy     No diagnosis found.    Goals addressed in session: Goal 1     DATA: Clinician explored client's current stressors-- discussed her son's illness and her daughter's need for rest. Clinician encouraged client to recognize how she has made executive decisions to place herself herself a priority, and requesting firm boundaries with her children.   During this session, this clinician used the following therapeutic modalities: Client-centered Therapy    Substance Abuse was not addressed during this session. If the client is diagnosed with a co-occurring substance use disorder, please indicate any changes in the frequency or amount of use: NA. Stage of change for addressing substance use diagnoses: No substance use/Not applicable    ASSESSMENT:  Skye Smith presents with a Euthymic/ normal mood.     her affect is Normal range and intensity, which is congruent, with her mood and the content of the session. The client has made progress on their goals.     Skye Smith presents with a none risk of suicide, none risk of self-harm, and none risk of harm to others.    For any risk assessment that surpasses a \"low\" rating, a safety plan must be developed.    A safety plan was indicated: no  If yes, describe in detail NA    PLAN: Between sessions, Skye Smith will continue to adjust and practice fleibility. At the next session, the therapist will use Client-centered Therapy to address ongoing stressors.    Behavioral Health Treatment Plan and Discharge Planning: Skye Smith is aware of and agrees to continue to work on their treatment plan. They have identified and are working toward their discharge goals. yes    Visit start and stop times:    03/19/24  Start Time: 1205  Stop Time: 1259  Total Visit Time: 54 minutes  Virtual Regular Visit    Verification of patient location:    Patient is located at Home in the following state " in which I hold an active license [place]      Assessment/Plan:    Problem List Items Addressed This Visit    None      Goals addressed in session: Goal 1          Reason for visit is No chief complaint on file.       Encounter provider Aerial Misael, WYATTW    Provider [facility]  [address] [place] [address] RD  [place] [address] [phone]      Recent Visits  Date Type Provider Dept   03/12/24 Telemedicine Aerial Misael, LCSW [facility]   Showing recent visits within past 7 days and meeting all other requirements  Today's Visits  Date Type Provider Dept   03/19/24 Telemedicine Aerial Misael, LCSW [facility]   Showing today's visits and meeting all other requirements  Future Appointments  No visits were found meeting these conditions.  Showing future appointments within next 150 days and meeting all other requirements       The patient was identified by name and date of birth. Skye Smith was informed that this is a telemedicine visit and that the visit is being conducted throughthe [facility] platform. She agrees to proceed..  My office door was closed. No one else was in the room.  She acknowledged consent and understanding of privacy and security of the video platform. The patient has agreed to participate and understands they can discontinue the visit at any time.    Patient is aware this is a billable service.     Subjective  Skye Smith is a 43 y.o. female  .      HPI     No past medical history on file.    No past surgical history on file.    No current outpatient medications on file.     No current facility-administered medications for this visit.        Not on File    Review of Systems    Video Exam    There were no vitals filed for this visit.    Physical Exam

## 2024-03-26 ENCOUNTER — TELEMEDICINE (OUTPATIENT)
Dept: PSYCHIATRY | Facility: CLINIC | Age: 44
End: 2024-03-26
Payer: COMMERCIAL

## 2024-03-26 DIAGNOSIS — F41.1 GENERALIZED ANXIETY DISORDER: ICD-10-CM

## 2024-03-26 DIAGNOSIS — F43.10 PTSD (POST-TRAUMATIC STRESS DISORDER): Primary | ICD-10-CM

## 2024-03-26 PROCEDURE — 90837 PSYTX W PT 60 MINUTES: CPT | Performed by: SOCIAL WORKER

## 2024-03-26 NOTE — PSYCH
"Behavioral Health Psychotherapy Progress Note    Psychotherapy Provided: Individual Psychotherapy     1. PTSD (post-traumatic stress disorder)        2. Generalized anxiety disorder            Goals addressed in session: Goal 1     DATA: Clinician explored client's current stressors-- discussed how she handled the anniversary of her  . Clinician and client discussed her needs within her relationship-- validated those needs and discussed necessary boundaries with his family.   During this session, this clinician used the following therapeutic modalities: Client-centered Therapy, Cognitive Behavioral Therapy, Solution-Focused Therapy, and Supportive Psychotherapy    Substance Abuse was not addressed during this session. If the client is diagnosed with a co-occurring substance use disorder, please indicate any changes in the frequency or amount of use: NA. Stage of change for addressing substance use diagnoses: No substance use/Not applicable    ASSESSMENT:  Skye Smith presents with a normal mood.     her affect is Normal range and intensity, which is congruent, with her mood and the content of the session. The client has made progress on their goals.     Skye Smith presents with a none risk of suicide, none risk of self-harm, and none risk of harm to others.    For any risk assessment that surpasses a \"low\" rating, a safety plan must be developed.    A safety plan was indicated: no  If yes, describe in detail NA    PLAN: Between sessions, Skye Smith will continue to advocate for emotional needs. At the next session, the therapist will use Client-centered Therapy to address ongoing stressors.    Behavioral Health Treatment Plan and Discharge Planning: Skye Smith is aware of and agrees to continue to work on their treatment plan. They have identified and are working toward their discharge goals. yes    Visit start and stop times:    24  Start Time: 1206  Stop Time: 1303  Total Visit Time: 57 " minutes    Virtual Regular Visit    Verification of patient location:    Patient is located at Home in the following state in which I hold an active license [place]      Assessment/Plan:    Problem List Items Addressed This Visit          Psychiatry Problems    Generalized anxiety disorder    PTSD (post-traumatic stress disorder) - Primary       Goals addressed in session: Goal 1          Reason for visit is No chief complaint on file.       Encounter provider Aerial Misael, LCSW    Provider [facility]  [facility] [place] [address] [facility] [place] [address] [phone]      Recent Visits  Date Type Provider Dept   03/19/24 Telemedicine Aerial Misael, LCSW [facility]   Showing recent visits within past 7 days and meeting all other requirements  Today's Visits  Date Type Provider Dept   03/26/24 Telemedicine Aerial Misael, LCSW [facility]   Showing today's visits and meeting all other requirements  Future Appointments  No visits were found meeting these conditions.  Showing future appointments within next 150 days and meeting all other requirements       The patient was identified by name and date of birth. Skye Smith was informed that this is a telemedicine visit and that the visit is being conducted throughthe [employer]_ platform. She agrees to proceed..  My office door was closed. No one else was in the room.  She acknowledged consent and understanding of privacy and security of the video platform. The patient has agreed to participate and understands they can discontinue the visit at any time.    Patient is aware this is a billable service.     Subjective  Skye Smith is a 43 y.o. female  .      HPI     No past medical history on file.    No past surgical history on file.    No current outpatient medications on file.     No current facility-administered medications for this visit.        Not on  File    Review of Systems    Video Exam    There were no vitals filed for this visit.    Physical Exam

## 2024-04-02 ENCOUNTER — TELEMEDICINE (OUTPATIENT)
Dept: PSYCHIATRY | Facility: CLINIC | Age: 44
End: 2024-04-02
Payer: COMMERCIAL

## 2024-04-02 DIAGNOSIS — F41.1 GENERALIZED ANXIETY DISORDER: ICD-10-CM

## 2024-04-02 DIAGNOSIS — F43.10 PTSD (POST-TRAUMATIC STRESS DISORDER): Primary | ICD-10-CM

## 2024-04-02 PROCEDURE — 90837 PSYTX W PT 60 MINUTES: CPT | Performed by: SOCIAL WORKER

## 2024-04-02 NOTE — PSYCH
"Behavioral Health Psychotherapy Progress Note    Psychotherapy Provided: Individual Psychotherapy     1. PTSD (post-traumatic stress disorder)        2. Generalized anxiety disorder            Goals addressed in session: Goal 1     DATA: Clinician explored client's current stressors-- discussed her parent's anxieties and affirmed her allowance of time for her to feel her emotions-- taking time for herself and then utilizing her family for comfort. Clinician and client discussed flexibility within her plans-- identifying the positives and allowing herself to feel the disappointment. Clinician allowed a safe place for brainstorming and discussed her fear regarding firearms-- clinician encouraged exposure-- starting talking and having several conversations with her partner.   During this session, this clinician used the following therapeutic modalities: Client-centered Therapy, Cognitive Behavioral Therapy, Solution-Focused Therapy, and Supportive Psychotherapy    Substance Abuse was not addressed during this session. If the client is diagnosed with a co-occurring substance use disorder, please indicate any changes in the frequency or amount of use: NA. Stage of change for addressing substance use diagnoses: No substance use/Not applicable    ASSESSMENT:  Skye Smith presents with a Euthymic/ normal mood.     her affect is Normal range and intensity, which is congruent, with her mood and the content of the session. The client has made progress on their goals.     Skye Smith presents with a none risk of suicide, none risk of self-harm, and none risk of harm to others.    For any risk assessment that surpasses a \"low\" rating, a safety plan must be developed.    A safety plan was indicated: no  If yes, describe in detail NA    PLAN: Between sessions, Skye Smith will expose herself to conversations regarding firearms. At the next session, the therapist will use Client-centered Therapy to address ongoing " stressors.    Behavioral Health Treatment Plan and Discharge Planning: Skye Smith is aware of and agrees to continue to work on their treatment plan. They have identified and are working toward their discharge goals. yes    Visit start and stop times:    04/02/24  Start Time: 1211  Stop Time: 1312  Total Visit Time: 61 minutes  Virtual Regular Visit    Verification of patient location:    Patient is located at Home in the following state in which I hold an active license [place]      Assessment/Plan:    Problem List Items Addressed This Visit          Psychiatry Problems    Generalized anxiety disorder    PTSD (post-traumatic stress disorder) - Primary       Goals addressed in session: Goal 1          Reason for visit is   Chief Complaint   Patient presents with    Virtual Regular Visit        Encounter provider Aerial ÁNGEL Douglas    [facility]  [address]  [place] [address] [phone]      Recent Visits  Date Type Provider Dept   04/02/24 Telemedicine Aerial ÁNGEL Douglas [facility]   Showing recent visits within past 7 days and meeting all other requirements  Future Appointments  No visits were found meeting these conditions.  Showing future appointments within next 150 days and meeting all other requirements       The patient was identified by name and date of birth. Skye Smith was informed that this is a telemedicine visit and that the visit is being conducted throughthe Gremln platform. She agrees to proceed..  My office door was closed. No one else was in the room.  She acknowledged consent and understanding of privacy and security of the video platform. The patient has agreed to participate and understands they can discontinue the visit at any time.    Patient is aware this is a billable service.     Subjective  Skye Smith is a 43 y.o. female  .      HPI     No past medical  history on file.    No past surgical history on file.    No current outpatient medications on file.     No current facility-administered medications for this visit.        Not on File    Review of Systems    Video Exam    There were no vitals filed for this visit.    Physical Exam

## 2024-04-09 ENCOUNTER — TELEMEDICINE (OUTPATIENT)
Dept: PSYCHIATRY | Facility: CLINIC | Age: 44
End: 2024-04-09

## 2024-04-09 DIAGNOSIS — F43.10 PTSD (POST-TRAUMATIC STRESS DISORDER): Primary | ICD-10-CM

## 2024-04-09 DIAGNOSIS — F41.1 GENERALIZED ANXIETY DISORDER: ICD-10-CM

## 2024-04-09 NOTE — PSYCH
"Behavioral Health Psychotherapy Progress Note    Psychotherapy Provided: Individual Psychotherapy     1. PTSD (post-traumatic stress disorder)        2. Generalized anxiety disorder            Goals addressed in session: Goal 1     DATA:Clinician explored client's current stressors-- affirmed client's efforts in making decision that benefit her children; clinician and client brainstormed alternative coping skills that she could use instead of looking for reassurance or help when she is anxious. Clinician assisted client in recognizing her capability in recogizing her rational thoughts.   During this session, this clinician used the following therapeutic modalities: Client-centered Therapy, Cognitive Behavioral Therapy, Solution-Focused Therapy, and Supportive Psychotherapy    Substance Abuse was not addressed during this session. If the client is diagnosed with a co-occurring substance use disorder, please indicate any changes in the frequency or amount of use: NA. Stage of change for addressing substance use diagnoses: No substance use/Not applicable    ASSESSMENT:  Skye Smith presents with a Euthymic/ normal, Anxious, and Dysthymic mood.     her affect is Normal range and intensity and Tearful, which is congruent, with her mood and the content of the session. The client has made progress on their goals.     Skye Smith presents with a none risk of suicide, none risk of self-harm, and none risk of harm to others.    For any risk assessment that surpasses a \"low\" rating, a safety plan must be developed.    A safety plan was indicated: no  If yes, describe in detail NA    PLAN: Between sessions, Skye Smith will continue to utilize her coping skills. At the next session, the therapist will use Client-centered Therapy to address ongoing stressors.    Behavioral Health Treatment Plan and Discharge Planning: Skye Smith is aware of and agrees to continue to work on their treatment plan. They have identified and are " working toward their discharge goals. yes    Visit start and stop times:    04/09/24  Start Time: 1204  Stop Time: 1301  Total Visit Time: 57 minutes  Virtual Regular Visit    Verification of patient location:    Patient is located at Home in the following state in which I hold an active license [place]      Assessment/Plan:    Problem List Items Addressed This Visit          Psychiatry Problems    Generalized anxiety disorder    PTSD (post-traumatic stress disorder) - Primary       Goals addressed in session: Goal 1          Reason for visit is   Chief Complaint   Patient presents with    Virtual Regular Visit        Encounter provider Aerial Misael, LCSW    Provider [facility]  [place] [facility] [place] [address]  [phone]      Recent Visits  Date Type Provider Dept   04/02/24 Telemedicine Aerial Misael, LCSW [facility]   Showing recent visits within past 7 days and meeting all other requirements  Today's Visits  Date Type Provider Dept   04/09/24 Telemedicine Aerial Misael, LCSW [facility]   Showing today's visits and meeting all other requirements  Future Appointments  No visits were found meeting these conditions.  Showing future appointments within next 150 days and meeting all other requirements       The patient was identified by name and date of birth. Skye Smith was informed that this is a telemedicine visit and that the visit is being conducted throughthe Kudo platform. She agrees to proceed..  My office door was closed. No one else was in the room.  She acknowledged consent and understanding of privacy and security of the video platform. The patient has agreed to participate and understands they can discontinue the visit at any time.    Patient is aware this is a billable service.     Subjective  Skye Smith is a 43 y.o. female  .      HPI     No past medical  history on file.    No past surgical history on file.    No current outpatient medications on file.     No current facility-administered medications for this visit.        Not on File    Review of Systems    Video Exam    There were no vitals filed for this visit.    Physical Exam

## 2024-04-16 ENCOUNTER — TELEMEDICINE (OUTPATIENT)
Dept: PSYCHIATRY | Facility: CLINIC | Age: 44
End: 2024-04-16
Payer: COMMERCIAL

## 2024-04-16 DIAGNOSIS — F43.10 PTSD (POST-TRAUMATIC STRESS DISORDER): Primary | ICD-10-CM

## 2024-04-16 DIAGNOSIS — F41.1 GENERALIZED ANXIETY DISORDER: ICD-10-CM

## 2024-04-16 PROCEDURE — 90837 PSYTX W PT 60 MINUTES: CPT | Performed by: SOCIAL WORKER

## 2024-04-16 NOTE — PSYCH
"Behavioral Health Psychotherapy Progress Note    Psychotherapy Provided: Individual Psychotherapy     No diagnosis found.    Goals addressed in session: Goal 1     DATA: Clinician and client discussed feelings of burn out-- discussed the importance of scheduling self care regularly; discussed being objective-- identifying facts and opinions and practicing assisting her daughter in recognizing reality and scapegoating.  During this session, this clinician used the following therapeutic modalities: Client-centered Therapy, Cognitive Behavioral Therapy, Solution-Focused Therapy, and Supportive Psychotherapy    Substance Abuse was not addressed during this session. If the client is diagnosed with a co-occurring substance use disorder, please indicate any changes in the frequency or amount of use: NA. Stage of change for addressing substance use diagnoses: No substance use/Not applicable    ASSESSMENT:  Skye Smith presents with a Euthymic/ normal mood.     her affect is Normal range and intensity, which is congruent, with her mood and the content of the session. The client has made progress on their goals.     Skye Smith presents with a none risk of suicide, none risk of self-harm, and none risk of harm to others.    For any risk assessment that surpasses a \"low\" rating, a safety plan must be developed.    A safety plan was indicated: no  If yes, describe in detail NA    PLAN: Between sessions, Skye Smith will continue to practice validating her emotions. At the next session, the therapist will use Client-centered Therapy to address ongoing stressors.    Behavioral Health Treatment Plan and Discharge Planning: Skye Smith is aware of and agrees to continue to work on their treatment plan. They have identified and are working toward their discharge goals. yes    Visit start and stop times:    04/16/24  Start Time: 1205  Stop Time: 1323  Total Visit Time: 78 minutes  Virtual Regular Visit    Verification of patient " location:    Patient is located at Home in the following state in which I hold an active license [place]      Assessment/Plan:    Problem List Items Addressed This Visit    None      Goals addressed in session: Goal 1          Reason for visit is No chief complaint on file.       Encounter provider Aerial ÁNGEL Douglas    Provider [facility]  [facility]  [facility] [place] [address]  [phone]      Recent Visits  Date Type Provider Dept   04/09/24 Telemedicine Aerial Misael, LCSW [facility]   Showing recent visits within past 7 days and meeting all other requirements  Today's Visits  Date Type Provider Dept   04/16/24 Telemedicine Aerial Misael, LCSW [facility]   Showing today's visits and meeting all other requirements  Future Appointments  No visits were found meeting these conditions.  Showing future appointments within next 150 days and meeting all other requirements       The patient was identified by name and date of birth. Skye Smith was informed that this is a telemedicine visit and that the visit is being conducted throughthe MeSixty platform. She agrees to proceed..  My office door was closed. No one else was in the room.  She acknowledged consent and understanding of privacy and security of the video platform. The patient has agreed to participate and understands they can discontinue the visit at any time.    Patient is aware this is a billable service.     Subjective  Skye Smith is a 43 y.o. female  .      HPI     No past medical history on file.    No past surgical history on file.    No current outpatient medications on file.     No current facility-administered medications for this visit.        Not on File    Review of Systems    Video Exam    There were no vitals filed for this visit.    Physical Exam

## 2024-04-30 ENCOUNTER — TELEMEDICINE (OUTPATIENT)
Dept: PSYCHIATRY | Facility: CLINIC | Age: 44
End: 2024-04-30

## 2024-04-30 DIAGNOSIS — F41.1 GENERALIZED ANXIETY DISORDER: ICD-10-CM

## 2024-04-30 DIAGNOSIS — F43.10 PTSD (POST-TRAUMATIC STRESS DISORDER): Primary | ICD-10-CM

## 2024-04-30 NOTE — PSYCH
"Behavioral Health Psychotherapy Progress Note    Psychotherapy Provided: Individual Psychotherapy     No diagnosis found.    Goals addressed in session: Goal 1     DATA: Clinician explored client's current stressors-- discussed partner having seizures, mom not approving of the relationship, and conflict with her daughter involving moving in together.  Clinician assisted client in recognizing her daughter's tendency to think black and white-- viewing the choice as her vs mom's partner-- but this is not the decision in reality. Clinician encouraged client to continue with family therapy for her and her daughter. Clinician and client discussed the timeline-- being made up and added pressure that is internal. Client explored the pain that marriage reminds her of-- and is trying to protect herself and her family from this.   During this session, this clinician used the following therapeutic modalities: Client-centered Therapy, Cognitive Behavioral Therapy, Solution-Focused Therapy, and Supportive Psychotherapy    Substance Abuse was not addressed during this session. If the client is diagnosed with a co-occurring substance use disorder, please indicate any changes in the frequency or amount of use: NA. Stage of change for addressing substance use diagnoses: No substance use/Not applicable    ASSESSMENT:  Skye Smith presents with a Euthymic/ normal mood.     her affect is Normal range and intensity, which is congruent, with her mood and the content of the session. The client has made progress on their goals.     Skye Smith presents with a none risk of suicide, none risk of self-harm, and none risk of harm to others.    For any risk assessment that surpasses a \"low\" rating, a safety plan must be developed.    A safety plan was indicated: no  If yes, describe in detail NA    PLAN: Between sessions, Skye Smith will continue to pursue family therapy for her daughter. At the next session, the therapist will use " Client-centered Therapy, Cognitive Behavioral Therapy, Solution-Focused Therapy, and Supportive Psychotherapy to address ongoing stressors.    Behavioral Health Treatment Plan and Discharge Planning: Skye Smith is aware of and agrees to continue to work on their treatment plan. They have identified and are working toward their discharge goals. yes    Visit start and stop times:    04/30/24  Start Time: 1206  Stop Time: 1307  Total Visit Time: 61 minutes  Virtual Regular Visit    Verification of patient location:    Patient is located at Home in the following state in which I hold an active license [place]      Assessment/Plan:    Problem List Items Addressed This Visit    None      Goals addressed in session: Goal 1          Reason for visit is No chief complaint on file.       Encounter provider Aerial Misael, LCSW      Recent Visits  Date Type Provider Dept   04/30/24 Telemedicine Aerial Misael, LCSW [facility]   Showing recent visits within past 7 days and meeting all other requirements  Future Appointments  No visits were found meeting these conditions.  Showing future appointments within next 150 days and meeting all other requirements       The patient was identified by name and date of birth. Skye Smith was informed that this is a telemedicine visit and that the visit is being conducted throughthe Mashery platform. She agrees to proceed..  My office door was closed. No one else was in the room.  She acknowledged consent and understanding of privacy and security of the video platform. The patient has agreed to participate and understands they can discontinue the visit at any time.    Patient is aware this is a billable service.     Subjective  Skye Smith is a 43 y.o. female  .      HPI     No past medical history on file.    No past surgical history on file.    No current outpatient medications on file.     No current facility-administered medications for this visit.        Not on  File    Review of Systems    Video Exam    There were no vitals filed for this visit.

## 2024-05-13 ENCOUNTER — TELEMEDICINE (OUTPATIENT)
Dept: PSYCHIATRY | Facility: CLINIC | Age: 44
End: 2024-05-13
Payer: COMMERCIAL

## 2024-05-13 DIAGNOSIS — F41.1 GENERALIZED ANXIETY DISORDER: ICD-10-CM

## 2024-05-13 DIAGNOSIS — F43.10 PTSD (POST-TRAUMATIC STRESS DISORDER): Primary | ICD-10-CM

## 2024-05-13 PROCEDURE — 90832 PSYTX W PT 30 MINUTES: CPT | Performed by: SOCIAL WORKER

## 2024-05-13 NOTE — PSYCH
"Behavioral Health Psychotherapy Progress Note    Psychotherapy Provided: Individual Psychotherapy     No diagnosis found.    Goals addressed in session: Goal 1     DATA: Clinician explored client's current stressors-- discussed concerns of her future and assisted her in recognizing she and her children are the only people to consider-- no outside pressures. Clinician validated client's worries with the lack of assertiveness and encouraged her to challenge her partner to make executive decisions.   During this session, this clinician used the following therapeutic modalities: Client-centered Therapy, Cognitive Behavioral Therapy, Solution-Focused Therapy, and Supportive Psychotherapy    Substance Abuse was not addressed during this session. If the client is diagnosed with a co-occurring substance use disorder, please indicate any changes in the frequency or amount of use: NA. Stage of change for addressing substance use diagnoses: No substance use/Not applicable    ASSESSMENT:  Skye Smith presents with a Euthymic/ normal mood.     her affect is Normal range and intensity, which is congruent, with her mood and the content of the session. The client has made progress on their goals.     Skye Smith presents with a none risk of suicide, none risk of self-harm, and none risk of harm to others.    For any risk assessment that surpasses a \"low\" rating, a safety plan must be developed.    A safety plan was indicated: no  If yes, describe in detail NA    PLAN: Between sessions, Skye Smith will continue to challenge her relationship. At the next session, the therapist will use Client-centered Therapy and Cognitive Behavioral Therapy to address ongoing stressors.    Behavioral Health Treatment Plan and Discharge Planning: Syke Smith is aware of and agrees to continue to work on their treatment plan. They have identified and are working toward their discharge goals. yes    Visit start and stop times:    05/13/24  Start " Time: 1300  Stop Time: 1333  Total Visit Time: 33 minutes  Virtual Regular Visit    Verification of patient location:    Patient is located at Home in the following state in which I hold an active license [place]      Assessment/Plan:    Problem List Items Addressed This Visit    None      Goals addressed in session: Goal 1          Reason for visit is No chief complaint on file.       Encounter provider Aerial ÁNGEL Douglas      Recent Visits  No visits were found meeting these conditions.  Showing recent visits within past 7 days and meeting all other requirements  Today's Visits  Date Type Provider Dept   05/13/24 Telemedicine Aerial ÁNGEL Douglas [facility]   Showing today's visits and meeting all other requirements  Future Appointments  No visits were found meeting these conditions.  Showing future appointments within next 150 days and meeting all other requirements       The patient was identified by name and date of birth. Skye Smith was informed that this is a telemedicine visit and that the visit is being conducted throughthe Merku platform. She agrees to proceed..  My office door was closed. No one else was in the room.  She acknowledged consent and understanding of privacy and security of the video platform. The patient has agreed to participate and understands they can discontinue the visit at any time.    Patient is aware this is a billable service.     Subjective  Skye Smith is a 43 y.o. female  .      HPI     No past medical history on file.    No past surgical history on file.    No current outpatient medications on file.     No current facility-administered medications for this visit.        Not on File    Review of Systems    Video Exam    There were no vitals filed for this visit.    Physical Exam

## 2024-05-20 ENCOUNTER — TELEMEDICINE (OUTPATIENT)
Dept: PSYCHIATRY | Facility: CLINIC | Age: 44
End: 2024-05-20
Payer: COMMERCIAL

## 2024-05-20 DIAGNOSIS — F43.10 PTSD (POST-TRAUMATIC STRESS DISORDER): Primary | ICD-10-CM

## 2024-05-20 DIAGNOSIS — F41.1 GENERALIZED ANXIETY DISORDER: ICD-10-CM

## 2024-05-20 PROCEDURE — 90837 PSYTX W PT 60 MINUTES: CPT | Performed by: SOCIAL WORKER

## 2024-05-20 NOTE — PSYCH
"Behavioral Health Psychotherapy Progress Note    Psychotherapy Provided: Individual Psychotherapy     No diagnosis found.    Goals addressed in session: Goal 1     DATA: Clinician explored client's current stressors-- discussed her relationship-- considering what his purpose was within her journey of healing. Clinician and client explored the pros and the cons, affirmed her feeling of relief when she is alone-- analyzed what would be morally correct, what would be taking advantage of the people within her life. Clinician reminded client that her priorities are her children and herself-- as well as there is no perfect person.   During this session, this clinician used the following therapeutic modalities: Client-centered Therapy, Cognitive Behavioral Therapy, Solution-Focused Therapy, and Supportive Psychotherapy    Substance Abuse was not addressed during this session. If the client is diagnosed with a co-occurring substance use disorder, please indicate any changes in the frequency or amount of use: NA. Stage of change for addressing substance use diagnoses: No substance use/Not applicable    ASSESSMENT:  Skye Smith presents with a Euthymic/ normal mood.     her affect is Normal range and intensity, which is congruent, with her mood and the content of the session. The client has made progress on their goals.     Skye Smith presents with a none risk of suicide, none risk of self-harm, and none risk of harm to others.    For any risk assessment that surpasses a \"low\" rating, a safety plan must be developed.    A safety plan was indicated: no  If yes, describe in detail NA    PLAN: Between sessions, Skye Smith will continue to brainstorm her wants and needs. At the next session, the therapist will use Client-centered Therapy to address ongoing stressors.    Behavioral Health Treatment Plan and Discharge Planning: kSye Smith is aware of and agrees to continue to work on their treatment plan. They have " identified and are working toward their discharge goals. yes    Visit start and stop times:    05/20/24  Start Time: 1302  Stop Time: 1402  Total Visit Time: 60 minutes    Virtual Regular Visit    Verification of patient location:    Patient is located at Home in the following state in which I hold an active license [place]      Assessment/Plan:    Problem List Items Addressed This Visit    None      Goals addressed in session: Goal 1          Reason for visit is No chief complaint on file.       Encounter provider Aerial Misael, WYATTW      Recent Visits  Date Type Provider Dept   05/20/24 Telemedicine Aerial Misael, LCSW [facility]   Showing recent visits within past 7 days and meeting all other requirements  Future Appointments  No visits were found meeting these conditions.  Showing future appointments within next 150 days and meeting all other requirements       The patient was identified by name and date of birth. Skye Smith was informed that this is a telemedicine visit and that the visit is being conducted throughthe Brainomix platform. She agrees to proceed..  My office door was closed. No one else was in the room.  She acknowledged consent and understanding of privacy and security of the video platform. The patient has agreed to participate and understands they can discontinue the visit at any time.    Patient is aware this is a billable service.     Subjective  Skye Smith is a 43 y.o. female  .      HPI     No past medical history on file.    No past surgical history on file.    No current outpatient medications on file.     No current facility-administered medications for this visit.        Not on File    Review of Systems    Video Exam    There were no vitals filed for this visit.    Physical Exam

## 2024-05-28 ENCOUNTER — TELEMEDICINE (OUTPATIENT)
Dept: PSYCHIATRY | Facility: CLINIC | Age: 44
End: 2024-05-28
Payer: COMMERCIAL

## 2024-05-28 DIAGNOSIS — F43.10 PTSD (POST-TRAUMATIC STRESS DISORDER): Primary | ICD-10-CM

## 2024-05-28 DIAGNOSIS — F41.1 GENERALIZED ANXIETY DISORDER: ICD-10-CM

## 2024-05-28 PROCEDURE — 90837 PSYTX W PT 60 MINUTES: CPT | Performed by: SOCIAL WORKER

## 2024-05-28 NOTE — PSYCH
"Behavioral Health Psychotherapy Progress Note    Psychotherapy Provided: Individual Psychotherapy     No diagnosis found.    Goals addressed in session: Goal 1     DATA: Clinician explored client's current stressors-- discussed her current thoughts regarding her relationship--assisted her in recognizing self induced pressure to make decisions regarding her relationship-- assisted client in living in the moment, following her emotions regarding her relationship and trusting herself as she is in tune with her thoughts and feelings  During this session, this clinician used the following therapeutic modalities: Client-centered Therapy, Cognitive Behavioral Therapy, Solution-Focused Therapy, and Supportive Psychotherapy    Substance Abuse was not addressed during this session. If the client is diagnosed with a co-occurring substance use disorder, please indicate any changes in the frequency or amount of use: NA. Stage of change for addressing substance use diagnoses: No substance use/Not applicable    ASSESSMENT:  Skye Smith presents with a Euthymic/ normal mood.     her affect is Normal range and intensity, which is congruent, with her mood and the content of the session. The client has made progress on their goals.     Skye Smith presents with a none risk of suicide, none risk of self-harm, and none risk of harm to others.    For any risk assessment that surpasses a \"low\" rating, a safety plan must be developed.    A safety plan was indicated: no  If yes, describe in detail NA    PLAN: Between sessions, Skye Smith will continue to utilize her coping skills and her support system-- being direct with her communication. At the next session, the therapist will use Client-centered Therapy, Cognitive Behavioral Therapy, Solution-Focused Therapy, and Supportive Psychotherapy to address ongoing stressors.    Behavioral Health Treatment Plan and Discharge Planning: Skye Smith is aware of and agrees to continue to work " on their treatment plan. They have identified and are working toward their discharge goals. yes    Visit start and stop times:    05/28/24  Start Time: 1201  Stop Time: 1302  Total Visit Time: 61 minutes    Virtual Regular Visit    Verification of patient location:    Patient is located at Home in the following state in which I hold an active license [place]      Assessment/Plan:    Problem List Items Addressed This Visit    None      Goals addressed in session: Goal 1          Reason for visit is   Chief Complaint   Patient presents with    Virtual Regular Visit        Encounter provider Aerial Misael, LCSW      Recent Visits  No visits were found meeting these conditions.  Showing recent visits within past 7 days and meeting all other requirements  Today's Visits  Date Type Provider Dept   05/28/24 Telemedicine Aerial Misael, LCSW [facility]   Showing today's visits and meeting all other requirements  Future Appointments  No visits were found meeting these conditions.  Showing future appointments within next 150 days and meeting all other requirements       The patient was identified by name and date of birth. Skye Smith was informed that this is a telemedicine visit and that the visit is being conducted throughthe [employer] platform. She agrees to proceed..  My office door was closed. No one else was in the room.  She acknowledged consent and understanding of privacy and security of the video platform. The patient has agreed to participate and understands they can discontinue the visit at any time.    Patient is aware this is a billable service.     Subjective  Skye Smith is a 43 y.o. female  .      HPI     No past medical history on file.    No past surgical history on file.    No current outpatient medications on file.     No current facility-administered medications for this visit.        Not on File    Review of Systems    Video Exam    There were no vitals filed for this  visit.    Physical Exam

## 2024-06-03 ENCOUNTER — TELEMEDICINE (OUTPATIENT)
Dept: PSYCHIATRY | Facility: CLINIC | Age: 44
End: 2024-06-03
Payer: COMMERCIAL

## 2024-06-03 DIAGNOSIS — F43.10 PTSD (POST-TRAUMATIC STRESS DISORDER): Primary | ICD-10-CM

## 2024-06-03 DIAGNOSIS — F41.1 GENERALIZED ANXIETY DISORDER: ICD-10-CM

## 2024-06-03 PROCEDURE — 90837 PSYTX W PT 60 MINUTES: CPT | Performed by: SOCIAL WORKER

## 2024-06-03 NOTE — BH TREATMENT PLAN
Outpatient Behavioral Health Psychotherapy Treatment Plan    Skye Smith  1980     Date of Initial Psychotherapy Assessment: 12/28/22   Date of Current Treatment Plan: 06/03/24  Treatment Plan Target Date: 6/03/25  Treatment Plan Expiration Date: 12/3/24    Diagnosis:   1. PTSD (post-traumatic stress disorder)        2. Generalized anxiety disorder                Area(s) of Need: I dont want to live in a state of anxiety.. (6/3/24): things still trigger me, they bring back the feelings of anger and resentment, but I have way more peace day to day. It is specific situation I can pin point, versus constantly feeling anxious.     I need more direction on going forward.. I want to look for ways to increase my self confidence.. and trust myself into decision making, understand better-- as far as why I feel the way that I do in certain situations. I dont want to be stuck in a thought loop that prevents me from making decisions..     Long Term Goal 1 (in the client's own words): I want to make peace with the situation for what it is now...     Stage of Change: Action    Target Date for completion: 6/26/24     Anticipated therapeutic modalities: berevement; compassion focused; trauma informed; strengths based     People identified to complete this goal: Client and clinician      Objective 1: (identify the means of measuring success in meeting the objective): Client will go through the 5 stages of grief regarding death of tkband-- identifying these and processing each stage;6/3/24: when I think about a future with someone else.. this is when I feel the grief.. client will allow herself to feel her feelings 5/5x; Client will practice forgiveness for herself--identifying at least 3 things she was held accountable for within her marriage-- and identifying reality 5/5x; 6/3/24-- trying hard not to repeat decisions that I have made.. I am being more direct in real time..trying to make things go away, this is never going  "to happen again.. I still experience the guilt when my kids express similar behaviors to your ex .        Objective 2: (identify the means of measuring success in meeting the objective): NA      Long Term Goal 2 (in the client's own words): I want to increase my self confidence    Stage of Change: Action    Target Date for completion: 6/26/24     Anticipated therapeutic modalities: somatic, mindfulness, breathwork, CBT, strengths based, exposure     People identified to complete this goal: Client and clinician      Objective 1: (identify the means of measuring success in meeting the objective):Client will practice positive self talk and will identify at least 3 personal strengths--will acknowledge the attributes that make her a \"good person\" client will practice mindfulness as she utilizes her strengths at least once a day. 6/3/24 there are certain areas I'm still not feeling confident in-- I feel calm, confident in my ability to run the house.. Client will practice her coping mechanisms 5/5x; client will continue to recognize avoidance and will continue to expose herself to anxiety inducing situations at least once a week; Client  will practice placing her needs as a priority at least once a week. Client will practice making decisions independently at least once a week; Client will practice at least 3 skills to make decisions; client will practice validating her decisions following consequences 5/5x.       Objective 2: (identify the means of measuring success in meeting the objective): NA     Long Term Goal 3 (in the client's own words): NA    Stage of Change: Pre-contemplation    Target Date for completion: NA     Anticipated therapeutic modalities: NA     People identified to complete this goal: NA      Objective 1: (identify the means of measuring success in meeting the objective): NA      Objective 2: (identify the means of measuring success in meeting the objective): NA     I am currently under the care " of a [facility] psychiatric provider: no    My [facility] psychiatric provider is: NA    I am currently taking psychiatric medications: No    I feel that I will be ready for discharge from mental health care when I reach the following (measurable goal/objective): When I dont have panic attacks in at least 90 consecutive days    For children and adults who have a legal guardian:   Has there been any change to custody orders and/or guardianship status? NA. If yes, attach updated documentation.    I have updated my Crisis Plan and have been offered a copy of this plan    Behavioral Health Treatment Plan [facility]: Diagnosis and Treatment Plan explained to Skye Smith acknowledges an understanding of their diagnosis. Skye Smith agrees to this treatment plan.    I have been offered a copy of this Treatment Plan. yes

## 2024-06-03 NOTE — PSYCH
"Behavioral Health Psychotherapy Progress Note    Psychotherapy Provided: Individual Psychotherapy     1. PTSD (post-traumatic stress disorder)        2. Generalized anxiety disorder            Goals addressed in session: Goal 1     DATA: Clinician explored client's current stressors-- discussed pros and cons of wanting to continue with Karate-- clinician empowered client about her capabilities, and the average expectations of someone after taking off. Clinician encouraged client to face her emotional fears-- recognizing them to not be a mortal danger.   .   During this session, this clinician used the following therapeutic modalities: Client-centered Therapy, Cognitive Behavioral Therapy, Solution-Focused Therapy, and Supportive Psychotherapy    Substance Abuse was not addressed during this session. If the client is diagnosed with a co-occurring substance use disorder, please indicate any changes in the frequency or amount of use: NA. Stage of change for addressing substance use diagnoses: No substance use/Not applicable    ASSESSMENT:  Skye Smith presents with a Euthymic/ normal and Anxious mood.     her affect is Normal range and intensity, which is congruent, with her mood and the content of the session. The client has made progress on their goals.     Skye Smith presents with a none risk of suicide, none risk of self-harm, and none risk of harm to others.    For any risk assessment that surpasses a \"low\" rating, a safety plan must be developed.    A safety plan was indicated: no  If yes, describe in detail NA    PLAN: Between sessions, Skye Smith will continue to make decisions and validate her decisions. At the next session, the therapist will use Client-centered Therapy, Cognitive Behavioral Therapy, Solution-Focused Therapy, and Supportive Psychotherapy to address ongoing stressors.    Behavioral Health Treatment Plan and Discharge Planning: Skye Smith is aware of and agrees to continue to work on " their treatment plan. They have identified and are working toward their discharge goals. yes    Visit start and stop times:    06/03/24  Start Time: 1303  Stop Time: 1409  Total Visit Time: 66 minutes  Virtual Regular Visit    Verification of patient location:    Patient is located at Home in the following state in which I hold an active license [place]      Assessment/Plan:    Problem List Items Addressed This Visit          Psychiatry Problems    Generalized anxiety disorder    PTSD (post-traumatic stress disorder) - Primary       Goals addressed in session: Goal 1          Reason for visit is No chief complaint on file.       Encounter provider Aerial Misael, LCSW      Recent Visits  Date Type Provider Dept   06/03/24 Telemedicine Aerial Misael, LCSW [facility]   05/28/24 Telemedicine Aerial Misael, LCSW [facility]   Showing recent visits within past 7 days and meeting all other requirements  Future Appointments  No visits were found meeting these conditions.  Showing future appointments within next 150 days and meeting all other requirements       The patient was identified by name and date of birth. Skye Smith was informed that this is a telemedicine visit and that the visit is being conducted throughthe [employer] platform. She agrees to proceed..  My office door was closed. No one else was in the room.  She acknowledged consent and understanding of privacy and security of the video platform. The patient has agreed to participate and understands they can discontinue the visit at any time.    Patient is aware this is a billable service.     Subjective  Skye Smith is a 43 y.o. female  .      HPI     No past medical history on file.    No past surgical history on file.    No current outpatient medications on file.     No current facility-administered medications for this visit.        Not on File    Review of Systems    Video Exam    There were no vitals filed for this  visit.    Physical Exam

## 2024-06-03 NOTE — BH CRISIS PLAN
"Client Name: Skye Smith       Client YOB: 1980  : 1980    Treatment Team (include name and contact information):     Psychotherapist: Ashley Douglas     Psychiatrist: DEE   Release of information completed: no    \" NA   Release of information completed: no    Other (Specify Role): NA    Release of information completed: no    Other (Specify Role): NA   Release of information completed: no    Healthcare Provider  Apollo Styles DO  [id number] [id number]    Type of Plan   * Child plans (children 12 yo and younger) must be completed and signed by the child's legal guardian   * Plans for all individuals 15 yo and above must be signed by the client.     Plan Type: adolescent/adult (14 and over) Initial      My Personal Strengths are (in the client's own words):  Compassionate I think about other people's feelings a lot; I am a good communicator through writing, speaking.     The stressors and triggers that may put me at risk are:  25 If the kids are screaming or fighting in the house-- gets me upset.. raised voices.. anything that their dad used to say that used to bother me--helping them see helpful or unhelpful words..     Coping skills I can use to keep myself calm and safe:  Physical activity and Other (describe) Stepping away.. breathing, go for a walk.. distracting myself.. listening to music... reframing my thoughts.     Coping skills/supports I can use to maintain abstinence from substance use:   NA    The people that provide me with help and support: (Include name, contact, and how they can help)   Support person #1: Mom-- Tammy    * Phone number: [phone]    * How can they help me? She listens.. if I am upset about something.. she will offer her advice.. and sometimes when I dont ask..    Support person #2:Best Friend-- Kelby     * Phone number: [phone]    * How can they help me? She is encouraging, I can do it.. I can get through it.. she says more " positive things to me.. gives me advice and she listens.      Support person #3: Victoriano-- Friend     * Phone number: [phone]    * How can they help me? He is close.. he can do a lot of things to help.. and he does.. him and his wife.. they are a support.. if I physically need something, I can rely on them. If I need help with the kids in an emergency, I have somewhere else to go. Older neighbors.. we visit them in our old neighborhood. We have stayed close with them     Seamus-- partner   Phone number: [phone]  He is comforting, he tells me everything will be okay, and he offers emotional support.     In the past, the following has helped me in times of crisis:    Taking a walk or exercising, Breathing exercises (or other mindfulness-based activities) and Using de-escalation tools that I have learned      If it is an emergency and you need immediate help, call 9-1-1    If there is a possibility of danger to yourself or others, call the following crisis hotline resources:     Adult Crisis Numbers  Suicide Prevention Hotline - Dial 9-8-8  [facility]: [phone]  [facility]: [phone]  [facility]: [phone]  [facility]: [phone]  [place]: [phone]  [facility]: [phone]  [facility]: [phone]  [facility]: [phone] (daytime).       [phone] (after hours, weekends, holidays)     Child/Adolescent Crisis Numbers   [facility]: [phone]   [facility]: [phone]   [place]: [phone]   [facility]: [phone]    Please note: Some [facility] do not have a separate number for Child/Adolescent specific crisis. If your county is not listed under Child/Adolescent, please call the adult number for your county     National Talk to Text Line   All Ages - [phone]    In the event your feelings become unmanageable, and you cannot reach your support system, you will call 911 immediately or go to  the nearest hospital emergency room.

## 2024-06-17 ENCOUNTER — TELEMEDICINE (OUTPATIENT)
Dept: PSYCHIATRY | Facility: CLINIC | Age: 44
End: 2024-06-17
Payer: COMMERCIAL

## 2024-06-17 DIAGNOSIS — F41.1 GENERALIZED ANXIETY DISORDER: ICD-10-CM

## 2024-06-17 DIAGNOSIS — F43.10 PTSD (POST-TRAUMATIC STRESS DISORDER): Primary | ICD-10-CM

## 2024-06-17 PROCEDURE — 90837 PSYTX W PT 60 MINUTES: CPT | Performed by: SOCIAL WORKER

## 2024-06-17 NOTE — PSYCH
"Behavioral Health Psychotherapy Progress Note    Psychotherapy Provided: Individual Psychotherapy     1. PTSD (post-traumatic stress disorder)        2. Generalized anxiety disorder            Goals addressed in session: Goal 1     DATA: Clinician explored client's current stressors-- affirmed her ability to validate her emotions--normalizing nerves with performance and concern when her daughter is sick. Clinician and client discussed the pressure of thinking about her life long term when she is unaware of what she wants for the future.   During this session, this clinician used the following therapeutic modalities: Client-centered Therapy, Cognitive Behavioral Therapy, Solution-Focused Therapy, and Supportive Psychotherapy    Substance Abuse was not addressed during this session. If the client is diagnosed with a co-occurring substance use disorder, please indicate any changes in the frequency or amount of use: NA. Stage of change for addressing substance use diagnoses: No substance use/Not applicable    ASSESSMENT:  Skye Smith presents with a Euthymic/ normal mood.     her affect is Normal range and intensity, which is congruent, with her mood and the content of the session. The client has made progress on their goals.     Skye Smith presents with a none risk of suicide, none risk of self-harm, and none risk of harm to others.    For any risk assessment that surpasses a \"low\" rating, a safety plan must be developed.    A safety plan was indicated: No  If yes, describe in detail NA    PLAN: Between sessions, Skye Smith will continue to be mindful of what she knows on the present day. At the next session, the therapist will use Client-centered Therapy to address ongoing stressors.    Behavioral Health Treatment Plan and Discharge Planning: Skye Smith is aware of and agrees to continue to work on their treatment plan. They have identified and are working toward their discharge goals. yes    Visit start and " stop times:    06/17/24  Start Time: 1307  Stop Time: 1411  Total Visit Time: 64 minutes  Virtual Regular Visit    Verification of patient location:    Patient is located at Home in the following state in which I hold an active license [place]      Assessment/Plan:    Problem List Items Addressed This Visit          Psychiatry Problems    Generalized anxiety disorder    PTSD (post-traumatic stress disorder) - Primary       Goals addressed in session: Goal 1          Reason for visit is No chief complaint on file.       Encounter provider Aerial Misael, ÁNGEL      Recent Visits  No visits were found meeting these conditions.  Showing recent visits within past 7 days and meeting all other requirements  Today's Visits  Date Type Provider Dept   06/17/24 Telemedicine Aerial Misael, LCSW [facility]   Showing today's visits and meeting all other requirements  Future Appointments  No visits were found meeting these conditions.  Showing future appointments within next 150 days and meeting all other requirements       The patient was identified by name and date of birth. Skye Smith was informed that this is a telemedicine visit and that the visit is being conducted throughthe [employer] platform. She agrees to proceed..  My office door was closed. No one else was in the room.  She acknowledged consent and understanding of privacy and security of the video platform. The patient has agreed to participate and understands they can discontinue the visit at any time.    Patient is aware this is a billable service.     Subjective  Skye Smith is a 43 y.o. female  .      HPI     No past medical history on file.    No past surgical history on file.    No current outpatient medications on file.     No current facility-administered medications for this visit.        Not on File    Review of Systems    Video Exam    There were no vitals filed for this visit.    Physical Exam

## 2024-06-24 ENCOUNTER — TELEMEDICINE (OUTPATIENT)
Dept: PSYCHIATRY | Facility: CLINIC | Age: 44
End: 2024-06-24
Payer: COMMERCIAL

## 2024-06-24 DIAGNOSIS — F41.1 GENERALIZED ANXIETY DISORDER: ICD-10-CM

## 2024-06-24 DIAGNOSIS — F43.10 PTSD (POST-TRAUMATIC STRESS DISORDER): Primary | ICD-10-CM

## 2024-06-24 PROCEDURE — 90837 PSYTX W PT 60 MINUTES: CPT | Performed by: SOCIAL WORKER

## 2024-06-24 NOTE — PSYCH
"Behavioral Health Psychotherapy Progress Note    Psychotherapy Provided: Individual Psychotherapy     No diagnosis found.    Goals addressed in session: Goal 1     DATA: Clinician explored client's current stressors-- discussed feelings of relief as her daughter's condition has been identified and a tx identified. Clinician and client discussed her doubts regarding her current relationship-- feeling as though she has outgrown it. Clinician and client discussed the way anxiety and excitement feel within her body-- acknowledging feeling brave and safe are great qualities.   During this session, this clinician used the following therapeutic modalities: Client-centered Therapy, Cognitive Behavioral Therapy, Solution-Focused Therapy, and Supportive Psychotherapy    Substance Abuse was not addressed during this session. If the client is diagnosed with a co-occurring substance use disorder, please indicate any changes in the frequency or amount of use: NA. Stage of change for addressing substance use diagnoses: No substance use/Not applicable    ASSESSMENT:  Skye Smith presents with a Euthymic/ normal mood.     her affect is Normal range and intensity, which is congruent, with her mood and the content of the session. The client has made progress on their goals.     Skye Smith presents with a none risk of suicide, none risk of self-harm, and none risk of harm to others.    For any risk assessment that surpasses a \"low\" rating, a safety plan must be developed.    A safety plan was indicated: no  If yes, describe in detail NA    PLAN: Between sessions, Skye Smith will continue advocating for her needs, pushing her support system to their capacities. . At the next session, the therapist will use Client-centered Therapy to address ongoing stressors.    Behavioral Health Treatment Plan and Discharge Planning: Skye Smith is aware of and agrees to continue to work on their treatment plan. They have identified and are " working toward their discharge goals. yes    Visit start and stop times:    06/24/24  Start Time: 1204  Stop Time: 1307  Total Visit Time: 63 minutes  Virtual Regular Visit    Verification of patient location:    Patient is located at Home in the following state in which I hold an active license [place]      Assessment/Plan:    Problem List Items Addressed This Visit    None      Goals addressed in session: Goal 1          Reason for visit is No chief complaint on file.       Encounter provider Aerial Misael, LCSW      Recent Visits  Date Type Provider Dept   06/24/24 Telemedicine Aerial Misael, LCSW [facility]   Showing recent visits within past 7 days and meeting all other requirements  Future Appointments  No visits were found meeting these conditions.  Showing future appointments within next 150 days and meeting all other requirements       The patient was identified by name and date of birth. Skye Smith was informed that this is a telemedicine visit and that the visit is being conducted throughthe [email or web address] platform. She agrees to proceed..  My office door was closed. No one else was in the room.  She acknowledged consent and understanding of privacy and security of the video platform. The patient has agreed to participate and understands they can discontinue the visit at any time.    Patient is aware this is a billable service.     Subjective  Skye Smith is a 43 y.o. female  .      HPI     No past medical history on file.    No past surgical history on file.    No current outpatient medications on file.     No current facility-administered medications for this visit.        Not on File    Review of Systems    Video Exam    There were no vitals filed for this visit.    Physical Exam

## 2024-07-01 ENCOUNTER — TELEPHONE (OUTPATIENT)
Dept: PSYCHIATRY | Facility: CLINIC | Age: 44
End: 2024-07-01

## 2024-07-01 NOTE — TELEPHONE ENCOUNTER
Patient is calling regarding cancelling an appointment.    Date/Time: 7/1/24 at 12pm    Reason: patient did not give a reason    Patient was rescheduled: YES [] NO [x]  If yes, when was Patient reschedule for: n/a    Patient requesting call back to reschedule: YES [] NO [x]

## 2024-07-09 ENCOUNTER — TELEMEDICINE (OUTPATIENT)
Dept: PSYCHIATRY | Facility: CLINIC | Age: 44
End: 2024-07-09
Payer: COMMERCIAL

## 2024-07-09 DIAGNOSIS — F43.10 PTSD (POST-TRAUMATIC STRESS DISORDER): Primary | ICD-10-CM

## 2024-07-09 DIAGNOSIS — F41.1 GENERALIZED ANXIETY DISORDER: ICD-10-CM

## 2024-07-09 PROCEDURE — 90837 PSYTX W PT 60 MINUTES: CPT | Performed by: SOCIAL WORKER

## 2024-07-09 NOTE — PSYCH
"Behavioral Health Psychotherapy Progress Note    Psychotherapy Provided: Individual Psychotherapy     No diagnosis found.    Goals addressed in session: Goal 1     DATA: Clinician explored client's current stressors-- discussed overextending herself-- attempting to be independent when she does need help. Clinician assisted client in recognizing when it is appropriate for her to utilize her resources. Clinician and lcient brainstormed things that she could do-- useful, playful, otherwise while she is restricted physically and healing. Clinician allowed a safe place for grieving the loss of her ex  as another birthday has passed for her children and he has missed it.      Better at [employer] [place].. kids are back at camp.. Cheryl's bacn her routine..     Lavell is okay either way.. Cheryl anti adela..   During this session, this clinician used the following therapeutic modalities: Bereavement Therapy, Client-centered Therapy, Cognitive Behavioral Therapy, Solution-Focused Therapy, and Supportive Psychotherapy    Substance Abuse was not addressed during this session. If the client is diagnosed with a co-occurring substance use disorder, please indicate any changes in the frequency or amount of use: NA. Stage of change for addressing substance use diagnoses: No substance use/Not applicable    ASSESSMENT:  Skye Smith presents with a Euthymic/ normal, Anxious, and Dysthymic mood.     her affect is Normal range and intensity and Tearful, which is congruent, with her mood and the content of the session. The client has made progress on their goals.     Skye Smith presents with a none risk of suicide, none risk of self-harm, and none risk of harm to others.    For any risk assessment that surpasses a \"low\" rating, a safety plan must be developed.    A safety plan was indicated: no  If yes, describe in detail NA    PLAN: Between sessions, Skye Smith will continue to utilize her time appropriately, respecting her " boundaries. At the next session, the therapist will use Client-centered Therapy to address ongoing stressors.    Behavioral Health Treatment Plan and Discharge Planning: Skye Smith is aware of and agrees to continue to work on their treatment plan. They have identified and are working toward their discharge goals. yes    Visit start and stop times:    07/09/24  Start Time: 1307  Stop Time: 1403  Total Visit Time: 56 minutes  Virtual Regular Visit    Verification of patient location:    Patient is located at Home in the following state in which I hold an active license [place]      Assessment/Plan:    Problem List Items Addressed This Visit    None      Goals addressed in session: Goal 1          Reason for visit is No chief complaint on file.       Encounter provider Aerial Misael, LCSW      Recent Visits  Date Type Provider Dept   07/09/24 Telemedicine Aerial Misael, LCSW [facility]   Showing recent visits within past 7 days and meeting all other requirements  Future Appointments  No visits were found meeting these conditions.  Showing future appointments within next 150 days and meeting all other requirements       The patient was identified by name and date of birth. Skye Smith was informed that this is a telemedicine visit and that the visit is being conducted throughthe [employer] platform. She agrees to proceed..  My office door was closed. No one else was in the room.  She acknowledged consent and understanding of privacy and security of the video platform. The patient has agreed to participate and understands they can discontinue the visit at any time.    Patient is aware this is a billable service.     Subjective  Skye Smith is a 43 y.o. female  .      HPI     No past medical history on file.    No past surgical history on file.    No current outpatient medications on file.     No current facility-administered medications for this visit.        Not on File    Review of  Systems    Video Exam    There were no vitals filed for this visit.    Physical Exam

## 2024-07-16 ENCOUNTER — TELEMEDICINE (OUTPATIENT)
Dept: PSYCHIATRY | Facility: CLINIC | Age: 44
End: 2024-07-16
Payer: COMMERCIAL

## 2024-07-16 DIAGNOSIS — F43.10 PTSD (POST-TRAUMATIC STRESS DISORDER): Primary | ICD-10-CM

## 2024-07-16 PROCEDURE — 90834 PSYTX W PT 45 MINUTES: CPT | Performed by: SOCIAL WORKER

## 2024-07-16 NOTE — PSYCH
"Behavioral Health Psychotherapy Progress Note    Psychotherapy Provided: Individual Psychotherapy     No diagnosis found.    Goals addressed in session: Goal 1     DATA: Clinician explored client's current stressors-- discussed her trauma responses when exposed to firearms, or family outings. Clinician encouraged client to identify when it is appropriate to avoid things within her life that she does not want to be apart of, and things within her life that cause her distress that she wants to desensitize to live a convenient life. Clinician and client explored ways that she can discuss her concerns, compromise with her partner, and expose herself to her triggers in a controlled/safe environment.   During this session, this clinician used the following therapeutic modalities: Client-centered Therapy, Cognitive Behavioral Therapy, Solution-Focused Therapy, and Supportive Psychotherapy    Substance Abuse was not addressed during this session. If the client is diagnosed with a co-occurring substance use disorder, please indicate any changes in the frequency or amount of use: NA. Stage of change for addressing substance use diagnoses: No substance use/Not applicable    ASSESSMENT:  Skye Smith presents with a Euthymic/ normal and Dysthymic mood.     her affect is Normal range and intensity, which is congruent, with her mood and the content of the session. The client has made progress on their goals.     Skye Smith presents with a none risk of suicide, none risk of self-harm, and none risk of harm to others.    For any risk assessment that surpasses a \"low\" rating, a safety plan must be developed.    A safety plan was indicated: no  If yes, describe in detail NA    PLAN: Between sessions, Skye Smith will continue to practice exposure and communicating her needs. At the next session, the therapist will use Client-centered Therapy to address ongoing stressos.    Behavioral Health Treatment Plan and Discharge Planning: " Skye Smith is aware of and agrees to continue to work on their treatment plan. They have identified and are working toward their discharge goals. yes    Visit start and stop times:    07/16/24  Start Time: 1705  Stop Time: 1746  Total Visit Time: 41 minutes  Virtual Regular Visit    Verification of patient location:    Patient is located at Home in the following state in which I hold an active license [place]      Assessment/Plan:    Problem List Items Addressed This Visit    None      Goals addressed in session: Goal 1          Reason for visit is No chief complaint on file.       Encounter provider Aerial Misael, LCSW      Recent Visits  Date Type Provider Dept   07/16/24 Telemedicine Aerial Misael, LCSW [facility]   Showing recent visits within past 7 days and meeting all other requirements  Future Appointments  No visits were found meeting these conditions.  Showing future appointments within next 150 days and meeting all other requirements       The patient was identified by name and date of birth. Skye Smith was informed that this is a telemedicine visit and that the visit is being conducted throughthe "[employer] platform. She agrees to proceed..  My office door was closed. No one else was in the room.  She acknowledged consent and understanding of privacy and security of the video platform. The patient has agreed to participate and understands they can discontinue the visit at any time.    Patient is aware this is a billable service.     Subjective  Skye Smith is a 43 y.o. female  .      HPI     No past medical history on file.    No past surgical history on file.    No current outpatient medications on file.     No current facility-administered medications for this visit.        Not on File    Review of Systems    Video Exam    There were no vitals filed for this visit.    Physical Exam

## 2024-07-23 ENCOUNTER — TELEMEDICINE (OUTPATIENT)
Dept: PSYCHIATRY | Facility: CLINIC | Age: 44
End: 2024-07-23
Payer: COMMERCIAL

## 2024-07-23 DIAGNOSIS — F43.10 PTSD (POST-TRAUMATIC STRESS DISORDER): Primary | ICD-10-CM

## 2024-07-23 DIAGNOSIS — F41.1 GENERALIZED ANXIETY DISORDER: ICD-10-CM

## 2024-07-23 PROCEDURE — 90837 PSYTX W PT 60 MINUTES: CPT | Performed by: SOCIAL WORKER

## 2024-07-23 NOTE — PSYCH
"    Behavioral Health Psychotherapy Progress Note    Psychotherapy Provided: Individual Psychotherapy     No diagnosis found.    Goals addressed in session: Goal 1     DATA: Clinician explored client's current stressors-- discussed triggers from her children-- wanting to exclude her ex 's personality but wanting to express her concerns regarding daughter's reactions and disrespect. Clinician encouraged client to identify themes-- exploring her fear of conflict; worst case scenario. Clinician encouraged client to give her worries to God, understanding that she will always worry when they resemble their father.   During this session, this clinician used the following therapeutic modalities: Bereavement Therapy, Client-centered Therapy, Cognitive Behavioral Therapy, Solution-Focused Therapy, and Supportive Psychotherapy    Substance Abuse was not addressed during this session. If the client is diagnosed with a co-occurring substance use disorder, please indicate any changes in the frequency or amount of use: NA. Stage of change for addressing substance use diagnoses: No substance use/Not applicable    ASSESSMENT:  Skye Smith presents with a Euthymic/ normal mood.     her affect is Normal range and intensity, which is congruent, with her mood and the content of the session. The client has made progress on their goals.     Skye Smith presents with a none risk of suicide, none risk of self-harm, and none risk of harm to others.    For any risk assessment that surpasses a \"low\" rating, a safety plan must be developed.    A safety plan was indicated: no  If yes, describe in detail NA    PLAN: Between sessions, Skye Smith will continue to advocate for her children. At the next session, the therapist will use Client-centered Therapy to address ongoing stressors.    Behavioral Health Treatment Plan and Discharge Planning: Skye Smith is aware of and agrees to continue to work on their treatment plan. They have " identified and are working toward their discharge goals. yes    Visit start and stop times:    07/23/24  Start Time: 1307  Stop Time: 1403  Total Visit Time: 56 minutes  Virtual Regular Visit    Verification of patient location:    Patient is located at Home in the following state in which I hold an active license [place]      Assessment/Plan:    Problem List Items Addressed This Visit    None      Goals addressed in session: Goal 1          Reason for visit is No chief complaint on file.       Encounter provider Aerial Misael, WYATTW      Recent Visits  Date Type Provider Dept   07/23/24 Telemedicine Aerial Misael, LCSW [facility]   Showing recent visits within past 7 days and meeting all other requirements  Future Appointments  No visits were found meeting these conditions.  Showing future appointments within next 150 days and meeting all other requirements       The patient was identified by name and date of birth. Skye Smith was informed that this is a telemedicine visit and that the visit is being conducted throughthe [employer] platform. She agrees to proceed..  My office door was closed. No one else was in the room.  She acknowledged consent and understanding of privacy and security of the video platform. The patient has agreed to participate and understands they can discontinue the visit at any time.    Patient is aware this is a billable service.     Subjective  Skye Smith is a 43 y.o. female  .      HPI     No past medical history on file.    No past surgical history on file.    No current outpatient medications on file.     No current facility-administered medications for this visit.        Not on File    Review of Systems    Video Exam    There were no vitals filed for this visit.    Physical Exam

## 2024-07-30 ENCOUNTER — TELEMEDICINE (OUTPATIENT)
Dept: PSYCHIATRY | Facility: CLINIC | Age: 44
End: 2024-07-30
Payer: COMMERCIAL

## 2024-07-30 DIAGNOSIS — F43.10 PTSD (POST-TRAUMATIC STRESS DISORDER): Primary | ICD-10-CM

## 2024-07-30 DIAGNOSIS — F41.1 GENERALIZED ANXIETY DISORDER: ICD-10-CM

## 2024-07-30 PROCEDURE — 90837 PSYTX W PT 60 MINUTES: CPT | Performed by: SOCIAL WORKER

## 2024-07-30 NOTE — PSYCH
"Behavioral Health Psychotherapy Progress Note    Psychotherapy Provided: Individual Psychotherapy     No diagnosis found.    Goals addressed in session: Goal 1     DATA: Clinician explored client's current stressors-- discussed her pro and cons list regarding breaking up with her partner. Clinician and client discussed the benefits of a separation for individual growth so as to cement whatever decision to stay with her partner. Transitioning into more independence, or requesting more space from her partner. Clinician and client discussed grieving a relationship-- feeling sadness but it continuing to be the right thing to do. Clinician and client discussed client associating freedom with growth.  During this session, this clinician used the following therapeutic modalities: Client-centered Therapy, Cognitive Behavioral Therapy, Solution-Focused Therapy, and Supportive Psychotherapy    Substance Abuse was not addressed during this session. If the client is diagnosed with a co-occurring substance use disorder, please indicate any changes in the frequency or amount of use: Na. Stage of change for addressing substance use diagnoses: No substance use/Not applicable    ASSESSMENT:  Skye Smith presents with a Euthymic/ normal and Anxious mood.     her affect is Normal range and intensity, which is congruent, with her mood and the content of the session. The client has made progress on their goals.     Skye Smith presents with a none risk of suicide, none risk of self-harm, and none risk of harm to others.    For any risk assessment that surpasses a \"low\" rating, a safety plan must be developed.    A safety plan was indicated: no  If yes, describe in detail NA    PLAN: Between sessions, Skye Smith will continue to contemplate a separation. At the next session, the therapist will use Client-centered Therapy to address ongoing stressors.    Behavioral Health Treatment Plan and Discharge Planning: Skye Smith is aware of " and agrees to continue to work on their treatment plan. They have identified and are working toward their discharge goals. yes    Visit start and stop times:    07/30/24  Start Time: 1309  Stop Time: 1415  Total Visit Time: 66 minutes  Virtual Regular Visit    Verification of patient location:    Patient is located at Home in the following state in which I hold an active license [place]      Assessment/Plan:    Problem List Items Addressed This Visit    None      Goals addressed in session: Goal 1          Reason for visit is   Chief Complaint   Patient presents with    Virtual Regular Visit        Encounter provider Aerial Misael, ÁNGEL      Recent Visits  Date Type Provider Dept   07/30/24 Telemedicine Aerial Misael, LCSW [facility]   Showing recent visits within past 7 days and meeting all other requirements  Future Appointments  No visits were found meeting these conditions.  Showing future appointments within next 150 days and meeting all other requirements       The patient was identified by name and date of birth. Skye Smith was informed that this is a telemedicine visit and that the visit is being conducted throughthe [employer] platform. She agrees to proceed..  My office door was closed. No one else was in the room.  She acknowledged consent and understanding of privacy and security of the video platform. The patient has agreed to participate and understands they can discontinue the visit at any time.    Patient is aware this is a billable service.     Subjective  Skye Smith is a 43 y.o. female  .      HPI     No past medical history on file.    No past surgical history on file.    No current outpatient medications on file.     No current facility-administered medications for this visit.        Not on File    Review of Systems    Video Exam    There were no vitals filed for this visit.    Physical Exam

## 2024-08-06 ENCOUNTER — TELEMEDICINE (OUTPATIENT)
Dept: PSYCHIATRY | Facility: CLINIC | Age: 44
End: 2024-08-06
Payer: COMMERCIAL

## 2024-08-06 DIAGNOSIS — F43.10 PTSD (POST-TRAUMATIC STRESS DISORDER): Primary | ICD-10-CM

## 2024-08-06 DIAGNOSIS — F41.1 GENERALIZED ANXIETY DISORDER: ICD-10-CM

## 2024-08-06 PROCEDURE — 90837 PSYTX W PT 60 MINUTES: CPT | Performed by: SOCIAL WORKER

## 2024-08-06 NOTE — PSYCH
"Behavioral Health Psychotherapy Progress Note    Psychotherapy Provided: Individual Psychotherapy     No diagnosis found.    Goals addressed in session: Goal 1     DATA: Clinician explored client's current stressors-- identified concerns with her brother and parent's mental health. Clinician assisted client in recognizing her limitations in regard to solving other people's problems. Clinician explored client's thought process regarding her family and her partner-- affirmed her efforts in challenging her anxiety.   During this session, this clinician used the following therapeutic modalities: Client-centered Therapy, Cognitive Behavioral Therapy, Solution-Focused Therapy, and Supportive Psychotherapy    Substance Abuse was not addressed during this session. If the client is diagnosed with a co-occurring substance use disorder, please indicate any changes in the frequency or amount of use: . Stage of change for addressing substance use diagnoses: No substance use/Not applicable    ASSESSMENT:  Skye Smith presents with a Euthymic/ normal mood.     her affect is Normal range and intensity, which is congruent, with her mood and the content of the session. The client has made progress on their goals.     Skye Smith presents with a none risk of suicide, none risk of self-harm, and none risk of harm to others.    For any risk assessment that surpasses a \"low\" rating, a safety plan must be developed.    A safety plan was indicated: no  If yes, describe in detail NA    PLAN: Between sessions, Skye Smith will continue choosing herself a. At the next session, the therapist will use Client-centered Therapy, Cognitive Behavioral Therapy, Solution-Focused Therapy, and Supportive Psychotherapy to address ongoing stressors.    Behavioral Health Treatment Plan and Discharge Planning: Skye Smith is aware of and agrees to continue to work on their treatment plan. They have identified and are working toward their discharge " goals. yes    Visit start and stop times:    08/06/24  Start Time: 1302  Stop Time: 1407  Total Visit Time: 65 minutes  Virtual Regular Visit    Verification of patient location:    Patient is located at Home in the following state in which I hold an active license [place]      Assessment/Plan:    Problem List Items Addressed This Visit    None      Goals addressed in session: Goal 1          Reason for visit is No chief complaint on file.       Encounter provider Aerial Misael, LCSW      Recent Visits  Date Type Provider Dept   08/06/24 Telemedicine Aerial Misael, LCSW [facility]   Showing recent visits within past 7 days and meeting all other requirements  Future Appointments  No visits were found meeting these conditions.  Showing future appointments within next 150 days and meeting all other requirements       The patient was identified by name and date of birth. Skye Smith was informed that this is a telemedicine visit and that the visit is being conducted throughthe [employer] platform. She agrees to proceed..  My office door was closed. No one else was in the room.  She acknowledged consent and understanding of privacy and security of the video platform. The patient has agreed to participate and understands they can discontinue the visit at any time.    Patient is aware this is a billable service.     Subjective  Skye Smith is a 43 y.o. female  .      HPI     No past medical history on file.    No past surgical history on file.    No current outpatient medications on file.     No current facility-administered medications for this visit.        Not on File    Review of Systems    Video Exam    There were no vitals filed for this visit.    Physical Exam

## 2024-08-20 ENCOUNTER — TELEMEDICINE (OUTPATIENT)
Dept: PSYCHIATRY | Facility: CLINIC | Age: 44
End: 2024-08-20
Payer: COMMERCIAL

## 2024-08-20 DIAGNOSIS — F43.10 PTSD (POST-TRAUMATIC STRESS DISORDER): Primary | ICD-10-CM

## 2024-08-20 PROCEDURE — 90837 PSYTX W PT 60 MINUTES: CPT | Performed by: SOCIAL WORKER

## 2024-08-20 NOTE — PSYCH
"Behavioral Health Psychotherapy Progress Note    Psychotherapy Provided: Individual Psychotherapy     No diagnosis found.    Goals addressed in session: Goal 1     DATA: Clinician explored client's current stressors-- discussed the pros and cons of her relationship-- identifying and problem solving ways to improve their intimacy. Clinician affirmed client's ability to recognize her needing space so as to avoid inappropriately expressing herself to her partner-- acknowledged his fears as well as the unattraction it brings. Clinician validated client's concerns and discussed the importance of being able to communicate to her partner openly.   During this session, this clinician used the following therapeutic modalities: Client-centered Therapy, Cognitive Behavioral Therapy, Solution-Focused Therapy, and Supportive Psychotherapy    Substance Abuse was not addressed during this session. If the client is diagnosed with a co-occurring substance use disorder, please indicate any changes in the frequency or amount of use: NA. Stage of change for addressing substance use diagnoses: No substance use/Not applicable    ASSESSMENT:  Skye Smith presents with a Euthymic/ normal mood.     her affect is Normal range and intensity, which is congruent, with her mood and the content of the session. The client has made progress on their goals.     Skye Smith presents with a none risk of suicide, none risk of self-harm, and none risk of harm to others.    For any risk assessment that surpasses a \"low\" rating, a safety plan must be developed.    A safety plan was indicated: no  If yes, describe in detail NA    PLAN: Between sessions, Skye Smith will continue to advocate for her needs. At the next session, the therapist will use Client-centered Therapy to address ongoing stressors.    Behavioral Health Treatment Plan and Discharge Planning: Skye Smith is aware of and agrees to continue to work on their treatment plan. They have " identified and are working toward their discharge goals. yes    Visit start and stop times:    08/20/24  Start Time: 1307  Stop Time: 1406  Total Visit Time: 59 minutes      Virtual Regular Visit    Verification of patient location:    Patient is located at Home in the following state in which I hold an active license [place]      Assessment/Plan:    Problem List Items Addressed This Visit    None      Goals addressed in session: Goal 1          Reason for visit is No chief complaint on file.       Encounter provider Aerial Misael, WYATTW      Recent Visits  Date Type Provider Dept   08/20/24 Telemedicine Aerial Misael, LCSW [facility]   Showing recent visits within past 7 days and meeting all other requirements  Future Appointments  No visits were found meeting these conditions.  Showing future appointments within next 150 days and meeting all other requirements       The patient was identified by name and date of birth. Skye Smith was informed that this is a telemedicine visit and that the visit is being conducted throughthe [employer] platform. She agrees to proceed..  My office door was closed. No one else was in the room.  She acknowledged consent and understanding of privacy and security of the video platform. The patient has agreed to participate and understands they can discontinue the visit at any time.    Patient is aware this is a billable service.     Subjective  Skye Smith is a 43 y.o. female  .      HPI     No past medical history on file.    No past surgical history on file.    No current outpatient medications on file.     No current facility-administered medications for this visit.        Not on File    Review of Systems    Video Exam    There were no vitals filed for this visit.    Physical Exam

## 2024-08-27 ENCOUNTER — TELEMEDICINE (OUTPATIENT)
Dept: PSYCHIATRY | Facility: CLINIC | Age: 44
End: 2024-08-27
Payer: COMMERCIAL

## 2024-08-27 DIAGNOSIS — F41.1 GENERALIZED ANXIETY DISORDER: ICD-10-CM

## 2024-08-27 DIAGNOSIS — F43.10 PTSD (POST-TRAUMATIC STRESS DISORDER): Primary | ICD-10-CM

## 2024-08-27 PROCEDURE — 90837 PSYTX W PT 60 MINUTES: CPT | Performed by: SOCIAL WORKER

## 2024-08-27 NOTE — PSYCH
"Behavioral Health Psychotherapy Progress Note    Psychotherapy Provided: Individual Psychotherapy     No diagnosis found.    Goals addressed in session: Goal 1     DATA: Clinician explored client's current stressors-- discussed triggers regarding school and the children growing older as well as unpredictable instances where her partner is stressed--and her perception of wanting to make sure he is not angry with her. Clinician assisted client in recognizing trauma responses at a cellular level and utilizing somatic processing to calm herself in the moment and to return to the issue at hand. Clinician and client discussed mindfulness within the moment-- practicing this as she recognizes the stability within her life.   During this session, this clinician used the following therapeutic modalities: Client-centered Therapy, Cognitive Behavioral Therapy, Solution-Focused Therapy, and Supportive Psychotherapy    Substance Abuse was not addressed during this session. If the client is diagnosed with a co-occurring substance use disorder, please indicate any changes in the frequency or amount of use: NA. Stage of change for addressing substance use diagnoses: No substance use/Not applicable    ASSESSMENT:  Skye Smith presents with a Euthymic/ normal mood.     her affect is Normal range and intensity, which is congruent, with her mood and the content of the session. The client has made progress on their goals.     Skye Smith presents with a none risk of suicide, none risk of self-harm, and none risk of harm to others.    For any risk assessment that surpasses a \"low\" rating, a safety plan must be developed.    A safety plan was indicated: no  If yes, describe in detail NA    PLAN: Between sessions, Skye Smith will continue to practice mindfulness. At the next session, the therapist will use Client-centered Therapy to address ongoing stressors.    Behavioral Health Treatment Plan and Discharge Planning: Skye Smith is " aware of and agrees to continue to work on their treatment plan. They have identified and are working toward their discharge goals. yes    Visit start and stop times:    08/27/24  Start Time: 1305  Stop Time: 1417  Total Visit Time: 72 minutes  Virtual Regular Visit    Verification of patient location:    Patient is located at Home in the following state in which I hold an active license [place]      Assessment/Plan:    Problem List Items Addressed This Visit    None      Goals addressed in session: Goal 1          Reason for visit is No chief complaint on file.       Encounter provider Aerial Misael, WYATTW      Recent Visits  Date Type Provider Dept   08/27/24 Telemedicine Aerial Misael, LCSW [facility]   Showing recent visits within past 7 days and meeting all other requirements  Future Appointments  No visits were found meeting these conditions.  Showing future appointments within next 150 days and meeting all other requirements       The patient was identified by name and date of birth. Skye Smith was informed that this is a telemedicine visit and that the visit is being conducted throughthe [employer] platform. She agrees to proceed..  My office door was closed. No one else was in the room.  She acknowledged consent and understanding of privacy and security of the video platform. The patient has agreed to participate and understands they can discontinue the visit at any time.    Patient is aware this is a billable service.     Subjective  Skye Smith is a 43 y.o. adult  .      HPI     No past medical history on file.    No past surgical history on file.    No current outpatient medications on file.     No current facility-administered medications for this visit.        Not on File    Review of Systems    Video Exam    There were no vitals filed for this visit.    Physical Exam

## 2024-09-03 ENCOUNTER — TELEMEDICINE (OUTPATIENT)
Dept: PSYCHIATRY | Facility: CLINIC | Age: 44
End: 2024-09-03
Payer: COMMERCIAL

## 2024-09-03 DIAGNOSIS — F41.1 GENERALIZED ANXIETY DISORDER: ICD-10-CM

## 2024-09-03 DIAGNOSIS — F43.10 PTSD (POST-TRAUMATIC STRESS DISORDER): Primary | ICD-10-CM

## 2024-09-03 PROCEDURE — 90837 PSYTX W PT 60 MINUTES: CPT | Performed by: SOCIAL WORKER

## 2024-09-03 NOTE — PSYCH
"Behavioral Health Psychotherapy Progress Note    Psychotherapy Provided: Individual Psychotherapy     1. PTSD (post-traumatic stress disorder)        2. Generalized anxiety disorder            Goals addressed in session: Goal 1     DATA: Clinician explored client's stressors--discussed concerns with her father-- medical, identifying what she can and cannot control as well as accepting hard truths, harsh realities, and feeling her emotions. Clinician affirmed client's efforts in exploring her son's opinion and grief regarding his father's passing and his mother's new boyfriend.   During this session, this clinician used the following therapeutic modalities: Client-centered Therapy, Cognitive Behavioral Therapy, Solution-Focused Therapy, and Supportive Psychotherapy    Substance Abuse was not addressed during this session. If the client is diagnosed with a co-occurring substance use disorder, please indicate any changes in the frequency or amount of use: NA. Stage of change for addressing substance use diagnoses: No substance use/Not applicable    ASSESSMENT:  Skye Smith presents with a Euthymic/ normal mood.     her affect is Normal range and intensity, which is congruent, with her mood and the content of the session. The client has made progress on their goals.     Skye Smith presents with a none risk of suicide, none risk of self-harm, and none risk of harm to others.    For any risk assessment that surpasses a \"low\" rating, a safety plan must be developed.    A safety plan was indicated: no  If yes, describe in detail NA    PLAN: Between sessions, Skye Smith will continue to accept what she can and cannot control. At the next session, the therapist will use Client-centered Therapy to address ongoing stressors.    Behavioral Health Treatment Plan and Discharge Planning: Skye Smith is aware of and agrees to continue to work on their treatment plan. They have identified and are working toward their discharge " goals. yes    Visit start and stop times:    09/03/24  Start Time: 1106  Stop Time: 1210  Total Visit Time: 64 minutes  Virtual Regular Visit    Verification of patient location:    Patient is located at Home in the following state in which I hold an active license [place]      Assessment/Plan:    Problem List Items Addressed This Visit          Psychiatry Problems    Generalized anxiety disorder    PTSD (post-traumatic stress disorder) - Primary       Goals addressed in session: Goal 1          Reason for visit is No chief complaint on file.       Encounter provider Aerial Misael, LCSW      Recent Visits  Date Type Provider Dept   09/03/24 Telemedicine Aerial Misael, LCSW [facility]   Showing recent visits within past 7 days and meeting all other requirements  Future Appointments  No visits were found meeting these conditions.  Showing future appointments within next 150 days and meeting all other requirements       The patient was identified by name and date of birth. Skye Smith was informed that this is a telemedicine visit and that the visit is being conducted throughthe [employer] platform. She agrees to proceed..  My office door was closed. No one else was in the room.  She acknowledged consent and understanding of privacy and security of the video platform. The patient has agreed to participate and understands they can discontinue the visit at any time.    Patient is aware this is a billable service.     Subjective  Skye Smith is a 43 y.o. adult  .      HPI     No past medical history on file.    No past surgical history on file.    No current outpatient medications on file.     No current facility-administered medications for this visit.        Not on File    Review of Systems    Video Exam    There were no vitals filed for this visit.    Physical Exam

## 2024-09-10 ENCOUNTER — TELEMEDICINE (OUTPATIENT)
Dept: PSYCHIATRY | Facility: CLINIC | Age: 44
End: 2024-09-10
Payer: COMMERCIAL

## 2024-09-10 DIAGNOSIS — F41.1 GENERALIZED ANXIETY DISORDER: ICD-10-CM

## 2024-09-10 DIAGNOSIS — F43.10 PTSD (POST-TRAUMATIC STRESS DISORDER): Primary | ICD-10-CM

## 2024-09-10 PROCEDURE — 90837 PSYTX W PT 60 MINUTES: CPT | Performed by: SOCIAL WORKER

## 2024-09-10 NOTE — PSYCH
"Behavioral Health Psychotherapy Progress Note    Psychotherapy Provided: Individual Psychotherapy     No diagnosis found.    Goals addressed in session: Goal 1     DATA: Clinician explored client's current stressors-- discussed her stressors regarding her family-- the role she plays while they are so far away concerned with her father's medical concerns. Clinician and client discussed what all she can do from where she is; as well as what she is in control over-- her natural limitations and ways that she can be a comfort as well as direct.   During this session, this clinician used the following therapeutic modalities: Client-centered Therapy, Cognitive Behavioral Therapy, Solution-Focused Therapy, and Supportive Psychotherapy    Substance Abuse was not addressed during this session. If the client is diagnosed with a co-occurring substance use disorder, please indicate any changes in the frequency or amount of use: NA. Stage of change for addressing substance use diagnoses: No substance use/Not applicable    ASSESSMENT:  Skye Smith presents with a Euthymic/ normal mood.     her affect is Normal range and intensity, which is congruent, with her mood and the content of the session. The client has made progress on their goals.     Skye Smith presents with a none risk of suicide, none risk of self-harm, and none risk of harm to others.    For any risk assessment that surpasses a \"low\" rating, a safety plan must be developed.    A safety plan was indicated: no  If yes, describe in detail NA    PLAN: Between sessions, Skye Smith will continue to utilize her coping skills. At the next session, the therapist will use Client-centered Therapy, Cognitive Behavioral Therapy, Solution-Focused Therapy, and Supportive Psychotherapy to address ongoing stressors.    Behavioral Health Treatment Plan and Discharge Planning: Skye Smith is aware of and agrees to continue to work on their treatment plan. They have identified and " are working toward their discharge goals. yes    Visit start and stop times:    09/10/24  Start Time: 0904  Stop Time: 1004  Total Visit Time: 60 minutes  Virtual Regular Visit    Verification of patient location:    Patient is located at Home in the following state in which I hold an active license [place]      Assessment/Plan:    Problem List Items Addressed This Visit    None      Goals addressed in session: Goal 1          Reason for visit is No chief complaint on file.       Encounter provider Aerial Misael, ÁNGEL      Recent Visits  Date Type Provider Dept   09/10/24 Telemedicine Aerial Misael, ÁNGEL [facility]   Showing recent visits within past 7 days and meeting all other requirements  Future Appointments  No visits were found meeting these conditions.  Showing future appointments within next 150 days and meeting all other requirements       The patient was identified by name and date of birth. Skye Smith was informed that this is a telemedicine visit and that the visit is being conducted throughthe [employer] platform. She agrees to proceed..  My office door was closed. No one else was in the room.  She acknowledged consent and understanding of privacy and security of the video platform. The patient has agreed to participate and understands they can discontinue the visit at any time.    Patient is aware this is a billable service.     Subjective  Skye Smith is a 43 y.o. adult  .      HPI     No past medical history on file.    No past surgical history on file.    No current outpatient medications on file.     No current facility-administered medications for this visit.        Not on File    Review of Systems    Video Exam    There were no vitals filed for this visit.    Physical Exam

## 2024-11-12 ENCOUNTER — TELEMEDICINE (OUTPATIENT)
Dept: PSYCHIATRY | Facility: CLINIC | Age: 44
End: 2024-11-12
Payer: COMMERCIAL

## 2024-11-12 DIAGNOSIS — F43.10 PTSD (POST-TRAUMATIC STRESS DISORDER): Primary | ICD-10-CM

## 2024-11-12 DIAGNOSIS — F41.1 GENERALIZED ANXIETY DISORDER: ICD-10-CM

## 2024-11-12 PROCEDURE — 90837 PSYTX W PT 60 MINUTES: CPT | Performed by: SOCIAL WORKER

## 2024-11-12 NOTE — PSYCH
"Behavioral Health Psychotherapy Progress Note    Psychotherapy Provided: Individual Psychotherapy     No diagnosis found.    Goals addressed in session: Goal 1     DATA: Clinician explored client's current stressors-- discussed medical concerns of her family-- identifying what is out of her control; self harming behaviors of her  daughter that were recently discovered; and concerns for her partner and his future. Clinician affirmed client's coping skills and assisted client in recognizing how she has done In the most trying times. Clinician and client discussed ways to approach and support her daughter, and compared and contrasted her life and her late ex 's life.   During this session, this clinician used the following therapeutic modalities: Client-centered Therapy, Mindfulness-based Strategies, Solution-Focused Therapy, and Supportive Psychotherapy    Substance Abuse was not addressed during this session. If the client is diagnosed with a co-occurring substance use disorder, please indicate any changes in the frequency or amount of use: Na. Stage of change for addressing substance use diagnoses: No substance use/Not applicable    ASSESSMENT:  Skye Smith presents with a Euthymic/ normal mood.     her affect is Normal range and intensity, which is congruent, with her mood and the content of the session. The client has made progress on their goals.     Skye Smith presents with a none risk of suicide, none risk of self-harm, and none risk of harm to others.    For any risk assessment that surpasses a \"low\" rating, a safety plan must be developed.    A safety plan was indicated: no  If yes, describe in detail  NA    PLAN: Between sessions, Skye Smith will continue to practice grounding. At the next session, the therapist will use Client-centered Therapy to address ongoing stressors.    Behavioral Health Treatment Plan and Discharge Planning: Skye Smith is aware of and agrees to continue to work on their " treatment plan. They have identified and are working toward their discharge goals. yes    Visit start and stop times:    11/12/24     Virtual Regular Visit    Verification of patient location:    Patient is located at Home in the following state in which I hold an active license [place]      Assessment/Plan:    Problem List Items Addressed This Visit    None      Goals addressed in session: Goal 1          Reason for visit is No chief complaint on file.       Encounter provider Aerial ÁNGEL Douglas      Recent Visits  No visits were found meeting these conditions.  Showing recent visits within past 7 days and meeting all other requirements  Future Appointments  No visits were found meeting these conditions.  Showing future appointments within next 150 days and meeting all other requirements       The patient was identified by name and date of birth. Skye Smith was informed that this is a telemedicine visit and that the visit is being conducted throughthe [employer] platform. She agrees to proceed..  My office door was closed. No one else was in the room.  She acknowledged consent and understanding of privacy and security of the video platform. The patient has agreed to participate and understands they can discontinue the visit at any time.    Patient is aware this is a billable service.     Subjective  Skye Smith is a 43 y.o. adult  .      HPI     No past medical history on file.    No past surgical history on file.    No current outpatient medications on file.     No current facility-administered medications for this visit.        Not on File    Review of Systems    Video Exam    There were no vitals filed for this visit.    Physical Exam

## 2024-11-19 ENCOUNTER — TELEMEDICINE (OUTPATIENT)
Dept: PSYCHIATRY | Facility: CLINIC | Age: 44
End: 2024-11-19
Payer: COMMERCIAL

## 2024-11-19 DIAGNOSIS — F41.1 GENERALIZED ANXIETY DISORDER: ICD-10-CM

## 2024-11-19 DIAGNOSIS — F43.10 PTSD (POST-TRAUMATIC STRESS DISORDER): Primary | ICD-10-CM

## 2024-11-19 PROCEDURE — 90837 PSYTX W PT 60 MINUTES: CPT | Performed by: SOCIAL WORKER

## 2024-11-19 NOTE — PSYCH
"Behavioral Health Psychotherapy Progress Note    Psychotherapy Provided: Individual Psychotherapy     1. PTSD (post-traumatic stress disorder)        2. Generalized anxiety disorder            Goals addressed in session: Goal 1     DATA: Clinician explored client's current stressors--discussed client's difficulty managing her schedule with all of her children and her work. Clinician and client brainstormed a way to incorporate normalcy, and reassured client that her situation is unusual and is understood as she has a good relationship wit hher boss. Clinician and client identified her emotions-- and assisted client in recognizing when she is shaming herself for her emotions.     During this session, this clinician used the following therapeutic modalities: Client-centered Therapy, Cognitive Behavioral Therapy, Solution-Focused Therapy, and Supportive Psychotherapy    Substance Abuse was not addressed during this session. If the client is diagnosed with a co-occurring substance use disorder, please indicate any changes in the frequency or amount of use: NA. Stage of change for addressing substance use diagnoses: No substance use/Not applicable    ASSESSMENT:  Skye Smith presents with a Euthymic/ normal mood.     her affect is Normal range and intensity, which is congruent, with her mood and the content of the session. The client has made progress on their goals.     Skye Smith presents with a none risk of suicide, none risk of self-harm, and none risk of harm to others.    For any risk assessment that surpasses a \"low\" rating, a safety plan must be developed.    A safety plan was indicated: no  If yes, describe in detail NA    PLAN: Between sessions, Skye Smith will continue to utilize her coping skills. At the next session, the therapist will use Client-centered Therapy to address ongoing stressors.    Behavioral Health Treatment Plan and Discharge Planning: Skye Smith is aware of and agrees to continue to " work on their treatment plan. They have identified and are working toward their discharge goals. yes    Visit start and stop times:    11/19/24  Start Time: 1308  Stop Time: 1413  Total Visit Time: 65 minutes  Virtual Regular Visit    Verification of patient location:    Patient is located at Home in the following state in which I hold an active license [place]      Assessment/Plan:    Problem List Items Addressed This Visit          Psychiatry Problems    Generalized anxiety disorder    PTSD (post-traumatic stress disorder) - Primary       Goals addressed in session: Goal 1          Reason for visit is No chief complaint on file.       Encounter provider Aerial Misael, ÁNGEL      Recent Visits  Date Type Provider Dept   11/19/24 Telemedicine Aerial Misael, LCSW [facility]   Showing recent visits within past 7 days and meeting all other requirements  Future Appointments  No visits were found meeting these conditions.  Showing future appointments within next 150 days and meeting all other requirements       The patient was identified by name and date of birth. Skye Smith was informed that this is a telemedicine visit and that the visit is being conducted throughthe [employer] platform. She agrees to proceed..  My office door was closed. No one else was in the room.  She acknowledged consent and understanding of privacy and security of the video platform. The patient has agreed to participate and understands they can discontinue the visit at any time.    Patient is aware this is a billable service.     Subjective  Skye Smith is a 43 y.o. adult  .      HPI     No past medical history on file.    No past surgical history on file.    No current outpatient medications on file.     No current facility-administered medications for this visit.        Not on File    Review of Systems    Video Exam    There were no vitals filed for this visit.    Physical Exam

## 2024-11-25 ENCOUNTER — TELEPHONE (OUTPATIENT)
Dept: PSYCHIATRY | Facility: CLINIC | Age: 44
End: 2024-11-25

## 2024-11-25 NOTE — TELEPHONE ENCOUNTER
Left detailed message for patient to cx 11/25 appt with Aerial Misael. Left TA number. Sent scheduling ticket via [employer].

## 2024-12-03 ENCOUNTER — TELEMEDICINE (OUTPATIENT)
Dept: PSYCHIATRY | Facility: CLINIC | Age: 44
End: 2024-12-03
Payer: COMMERCIAL

## 2024-12-03 DIAGNOSIS — F43.10 PTSD (POST-TRAUMATIC STRESS DISORDER): Primary | ICD-10-CM

## 2024-12-03 DIAGNOSIS — F41.1 GENERALIZED ANXIETY DISORDER: ICD-10-CM

## 2024-12-03 PROCEDURE — 90837 PSYTX W PT 60 MINUTES: CPT | Performed by: SOCIAL WORKER

## 2024-12-03 NOTE — BH TREATMENT PLAN
Outpatient Behavioral Health Psychotherapy Treatment Plan    Skye Smith  1980     Date of Initial Psychotherapy Assessment: 12/28/22   Date of Current Treatment Plan: 12/03/24  Treatment Plan Target Date: 6/03/25  Treatment Plan Expiration Date: 12/3/24    Diagnosis:   No diagnosis found.          Area(s) of Need: I dont want to live in a state of anxiety.. (6/3/24): things still trigger me, they bring back the feelings of anger and resentment, but I have way more peace day to day. It is specific situation I can pin point, versus constantly feeling anxious. UPDATE 12/3/24--less anger and resentment; less new situations that are causing anxiety.. I know in advance when the bad feelings are happening..     I need more direction on going forward.. I want to look for ways to increase my self confidence.. and trust myself into decision making, understand better-- as far as why I feel the way that I do in certain situations. I dont want to be stuck in a thought loop that prevents me from making decisions.. UPDATE 12/3/24-- some of this is time.. the more you have to handle this, the more confident you feel.. I still experience the thought loops..     Thinking about the future.. what do I want for my long term future.. time goes so fast.. the kids are growing up.. Panfilo is in high school; Cheryl is in College.. how can I prepare to the next phase of life.. Setting goals for myself..something tangible.. in 2 years I want to do, feel, or be.. mom, dad, and brother come live with us..     Long Term Goal 1 (in the client's own words): I want to make peace with the situation for what it is now...     Stage of Change: Action    Target Date for completion: 6/26/24     Anticipated therapeutic modalities: berevement; compassion focused; trauma informed; strengths based     People identified to complete this goal: Client and clinician      Objective 1: (identify the means of measuring success in meeting the objective):  "Client will go through the 5 stages of grief regarding death of exrajanband-- identifying these and processing each stage;6/3/24: when I think about a future with someone else.. this is when I feel the grief.. client will allow herself to feel her feelings 5/5x; Client will practice forgiveness for herself--identifying at least 3 things she was held accountable for within her marriage-- and identifying reality 5/5x; 6/3/24-- trying hard not to repeat decisions that I have made.. I am being more direct in real time..trying to make things go away, this is never going to happen again.. I still experience the guilt when my kids express similar behaviors to your ex .  UPDATE 12/3/24-- time and consistency help with these things.. the more time that goes on the less behaviors I see.. I'm setting an example of the different type of behavior..       Objective 2: (identify the means of measuring success in meeting the objective): NA      Long Term Goal 2 (in the client's own words): I want to increase my self confidence    Stage of Change: Action    Target Date for completion: 6/26/24     Anticipated therapeutic modalities: somatic, mindfulness, breathwork, CBT, strengths based, exposure     People identified to complete this goal: Client and clinician      Objective 1: (identify the means of measuring success in meeting the objective):Client will practice positive self talk and will identify at least 3 personal strengths--will acknowledge the attributes that make her a \"good person\" client will practice mindfulness as she utilizes her strengths at least once a day. 6/3/24 there are certain areas I'm still not feeling confident in-- I feel calm, confident in my ability to run the house.. Client will practice her coping mechanisms 5/5x; client will continue to recognize avoidance and will continue to expose herself to anxiety inducing situations at least once a week; 12/3/24 UPDATE: ITS TIME AND CONSISTENCY.. WE CAN " HANDLE THIS. Client  will practice placing her needs as a priority at least once a week; Client will practice making decisions independently at least once a week; Client will practice at least 3 skills to make decisions; client will practice validating her decisions following consequences 5/5x.       Objective 2: (identify the means of measuring success in meeting the objective): NA     Long Term Goal 3 (in the client's own words): NA    Stage of Change: Pre-contemplation    Target Date for completion: NA     Anticipated therapeutic modalities: NA     People identified to complete this goal: NA      Objective 1: (identify the means of measuring success in meeting the objective): NA      Objective 2: (identify the means of measuring success in meeting the objective): NA     I am currently under the care of a [facility] psychiatric provider: no    My [facility] psychiatric provider is: NA    I am currently taking psychiatric medications: No    I feel that I will be ready for discharge from mental health care when I reach the following (measurable goal/objective): When I dont have panic attacks in at least 90 consecutive days    For children and adults who have a legal guardian:   Has there been any change to custody orders and/or guardianship status? NA. If yes, attach updated documentation.    I have updated my Crisis Plan and have been offered a copy of this plan    Behavioral Health Treatment Plan [facility]: Diagnosis and Treatment Plan explained to Skye Smith acknowledges an understanding of their diagnosis. Skye Smith agrees to this treatment plan.    I have been offered a copy of this Treatment Plan. yes

## 2024-12-03 NOTE — PSYCH
"Behavioral Health Psychotherapy Progress Note    Psychotherapy Provided: Individual Psychotherapy     1. PTSD (post-traumatic stress disorder)        2. Generalized anxiety disorder            Goals addressed in session: Goal 1     DATA: Clinician explored client's current stress and discussed her problem solving and progress made with her family. Clinician and client discussed what she is in control of in regard to her family.   During this session, this clinician used the following therapeutic modalities: Client-centered Therapy, Solution-Focused Therapy, and Supportive Psychotherapy    Substance Abuse was not addressed during this session. If the client is diagnosed with a co-occurring substance use disorder, please indicate any changes in the frequency or amount of use: NA. Stage of change for addressing substance use diagnoses: No substance use/Not applicable    ASSESSMENT:  Skye Smith presents with a Euthymic/ normal mood.     her affect is Normal range and intensity, which is congruent, with her mood and the content of the session. The client has made progress on their goals.     Skye Smith presents with a none risk of suicide, none risk of self-harm, and none risk of harm to others.    For any risk assessment that surpasses a \"low\" rating, a safety plan must be developed.    A safety plan was indicated: no  If yes, describe in detail NA    PLAN: Between sessions, Skye Smith will continue to utilize her coping skills. At the next session, the therapist will use Client-centered Therapy to address ongoing stressors.    Behavioral Health Treatment Plan and Discharge Planning: Skye Smith is aware of and agrees to continue to work on their treatment plan. They have identified and are working toward their discharge goals. yes    Visit start and stop times:    12/03/24  Start Time: 1101  Stop Time: 1159  Total Visit Time: 58 minutes  Virtual Regular Visit    Verification of patient location:    Patient is " located at Home in the following state in which I hold an active license [place]      Assessment/Plan:    Problem List Items Addressed This Visit          Psychiatry Problems    Generalized anxiety disorder    PTSD (post-traumatic stress disorder) - Primary       Goals addressed in session: Goal 1          Reason for visit is No chief complaint on file.       Encounter provider Aerial ÁNGEL Douglas      Recent Visits  Date Type Provider Dept   12/03/24 Telemedicine Aerial Misael, ÁNGEL [facility]   Showing recent visits within past 7 days and meeting all other requirements  Future Appointments  No visits were found meeting these conditions.  Showing future appointments within next 150 days and meeting all other requirements       The patient was identified by name and date of birth. Skye Smith was informed that this is a telemedicine visit and that the visit is being conducted throughthe Epic Embedded platform. She agrees to proceed..  My office door was closed. No one else was in the room.  She acknowledged consent and understanding of privacy and security of the video platform. The patient has agreed to participate and understands they can discontinue the visit at any time.    Patient is aware this is a billable service.     Subjective  Skye Smith is a 43 y.o. adult  .      HPI     No past medical history on file.    No past surgical history on file.    No current outpatient medications on file.     No current facility-administered medications for this visit.        Not on File    Review of Systems    Video Exam    There were no vitals filed for this visit.    Physical Exam

## 2024-12-11 ENCOUNTER — TELEMEDICINE (OUTPATIENT)
Dept: PSYCHIATRY | Facility: CLINIC | Age: 44
End: 2024-12-11
Payer: COMMERCIAL

## 2024-12-11 DIAGNOSIS — F43.10 PTSD (POST-TRAUMATIC STRESS DISORDER): Primary | ICD-10-CM

## 2024-12-11 DIAGNOSIS — F41.1 GENERALIZED ANXIETY DISORDER: ICD-10-CM

## 2024-12-11 PROCEDURE — 90837 PSYTX W PT 60 MINUTES: CPT | Performed by: SOCIAL WORKER

## 2024-12-11 NOTE — PSYCH
"Behavioral Health Psychotherapy Progress Note    Psychotherapy Provided: Individual Psychotherapy     No diagnosis found.    Goals addressed in session: Goal 1     DATA: Clinician explored client's current stressors discussed difficulties at work and needing to cut back on duties around the house. Client expressed a want to increase her children's independence in the home. Clinician encouraged client to continue to take charge within her relationship.   During this session, this clinician used the following therapeutic modalities: Client-centered Therapy, Cognitive Behavioral Therapy, and Solution-Focused Therapy    Substance Abuse was not addressed during this session. If the client is diagnosed with a co-occurring substance use disorder, please indicate any changes in the frequency or amount of use: NA. Stage of change for addressing substance use diagnoses: No substance use/Not applicable    ASSESSMENT:  Skye Smith presents with a Euthymic/ normal mood.     her affect is Normal range and intensity, which is congruent, with her mood and the content of the session. The client has made progress on their goals.     Skye Smith presents with a none risk of suicide, none risk of self-harm, and none risk of harm to others.    For any risk assessment that surpasses a \"low\" rating, a safety plan must be developed.    A safety plan was indicated: no  If yes, describe in detail  NA    PLAN: Between sessions, Skye Smith will continue to abide by her natural limitations. At the next session, the therapist will use Client-centered Therapy to address ongoing stressors.    Behavioral Health Treatment Plan and Discharge Planning: Skye Smith is aware of and agrees to continue to work on their treatment plan. They have identified and are working toward their discharge goals. yes    Depression Follow-up Plan Completed: Not applicable    Visit start and stop times:    12/11/24     Virtual Regular Visit    Verification of " patient location:    Patient is located at Home in the following state in which I hold an active license [place]      Assessment/Plan:    Problem List Items Addressed This Visit    None      Goals addressed in session: Goal 1     Depression Follow-up Plan Completed: Not applicable    Reason for visit is No chief complaint on file.       Encounter provider Ashley Douglas LCSW      Recent Visits  No visits were found meeting these conditions.  Showing recent visits within past 7 days and meeting all other requirements  Future Appointments  No visits were found meeting these conditions.  Showing future appointments within next 150 days and meeting all other requirements       The patient was identified by name and date of birth. Skye Smith was informed that this is a telemedicine visit and that the visit is being conducted throughthe Epic Embedded platform. She agrees to proceed..  My office door was closed. No one else was in the room.  She acknowledged consent and understanding of privacy and security of the video platform. The patient has agreed to participate and understands they can discontinue the visit at any time.    Patient is aware this is a billable service.     Subjective  Skye Smith is a 43 y.o. adult  .      HPI     No past medical history on file.    No past surgical history on file.    No current outpatient medications on file.     No current facility-administered medications for this visit.        Not on File    Review of Systems    Video Exam    There were no vitals filed for this visit.    Physical Exam

## 2024-12-18 ENCOUNTER — TELEMEDICINE (OUTPATIENT)
Dept: PSYCHIATRY | Facility: CLINIC | Age: 44
End: 2024-12-18
Payer: COMMERCIAL

## 2024-12-18 DIAGNOSIS — F41.1 GENERALIZED ANXIETY DISORDER: ICD-10-CM

## 2024-12-18 DIAGNOSIS — F43.10 PTSD (POST-TRAUMATIC STRESS DISORDER): Primary | ICD-10-CM

## 2024-12-18 PROCEDURE — 90837 PSYTX W PT 60 MINUTES: CPT | Performed by: SOCIAL WORKER

## 2024-12-18 NOTE — PSYCH
"Behavioral Health Psychotherapy Progress Note    Psychotherapy Provided: Individual Psychotherapy     1. PTSD (post-traumatic stress disorder)        2. Generalized anxiety disorder            Goals addressed in session: Goal 1     DATA: Clinician explored client's current stressors-- discussed her feelings of guilt as she has taken a break frmo her boyfriend of 1.5 years; clinician and client discussed the level of commitment that she has had throughout the relationship and affirmed her decision to take a break-- and not breaking it off as she is unsure. Clinician encouraged client to give herself boundaries in regard to relationships that are not at the level that she would like them to be.    During this session, this clinician used the following therapeutic modalities: Client-centered Therapy, Cognitive Behavioral Therapy, Solution-Focused Therapy, and Supportive Psychotherapy    Substance Abuse was not addressed during this session. If the client is diagnosed with a co-occurring substance use disorder, please indicate any changes in the frequency or amount of use: NA. Stage of change for addressing substance use diagnoses: No substance use/Not applicable    ASSESSMENT:  Skye Smith presents with a Euthymic/ normal mood.     her affect is Normal range and intensity, which is congruent, with her mood and the content of the session. The client has made progress on their goals.     Skye Smith presents with a none risk of suicide, none risk of self-harm, and none risk of harm to others.    For any risk assessment that surpasses a \"low\" rating, a safety plan must be developed.    A safety plan was indicated: no  If yes, describe in detail NA    PLAN: Between sessions, Skye Smith will continue to utilize her coping skills. At the next session, the therapist will use Client-centered Therapy to address ongoing stressor.    Behavioral Health Treatment Plan and Discharge Planning: Skye Smith is aware of and agrees " to continue to work on their treatment plan. They have identified and are working toward their discharge goals. yes    Depression Follow-up Plan Completed: Not applicable    Visit start and stop times:    12/18/24  Start Time: 1310  Stop Time: 1416  Total Visit Time: 66 minutes  Virtual Regular Visit    Verification of patient location:    Patient is located at Home in the following state in which I hold an active license [place]      Assessment/Plan:    Problem List Items Addressed This Visit          Psychiatry Problems    Generalized anxiety disorder    PTSD (post-traumatic stress disorder) - Primary       Goals addressed in session: Goal 1     Depression Follow-up Plan Completed: Not applicable    Reason for visit is   Chief Complaint   Patient presents with    Virtual Regular Visit        Encounter provider Aerial Misael, LCSW      Recent Visits  Date Type Provider Dept   12/18/24 Telemedicine Aerial Misael, LCSW [facility]   Showing recent visits within past 7 days and meeting all other requirements  Future Appointments  No visits were found meeting these conditions.  Showing future appointments within next 150 days and meeting all other requirements       The patient was identified by name and date of birth. Skye Smith was informed that this is a telemedicine visit and that the visit is being conducted throughthe Epic Embedded platform. She agrees to proceed..  My office door was closed. No one else was in the room.  She acknowledged consent and understanding of privacy and security of the video platform. The patient has agreed to participate and understands they can discontinue the visit at any time.    Patient is aware this is a billable service.     Subjective  Skye Smith is a 43 y.o. female  .      HPI     No past medical history on file.    No past surgical history on file.    No current outpatient medications on file.     No current facility-administered medications for this visit.         Not on File    Review of Systems    Video Exam    There were no vitals filed for this visit.    Physical Exam

## 2024-12-31 ENCOUNTER — TELEMEDICINE (OUTPATIENT)
Dept: PSYCHIATRY | Facility: CLINIC | Age: 44
End: 2024-12-31
Payer: COMMERCIAL

## 2024-12-31 DIAGNOSIS — F41.1 GENERALIZED ANXIETY DISORDER: ICD-10-CM

## 2024-12-31 DIAGNOSIS — F43.10 PTSD (POST-TRAUMATIC STRESS DISORDER): Primary | ICD-10-CM

## 2024-12-31 PROCEDURE — 90837 PSYTX W PT 60 MINUTES: CPT | Performed by: SOCIAL WORKER

## 2024-12-31 NOTE — PSYCH
"Behavioral Health Psychotherapy Progress Note    Psychotherapy Provided: Individual Psychotherapy     No diagnosis found.    Goals addressed in session: Goal 1     DATA: Clinician explored client;s current stressors-- discussed feelings of guilt, pros and cons of allowing her ex to talk with her about her break up before making her mind up about her own wants. Clinician assisted  client in recognizing trends of fear, sacrificing herself to ensure a future that she is unsure about. Clinician affirmed client;s attempts at being direct within her relationship, and the power of being honest to a point. Clinician provided emotional support as she grieved the loss of her ex -- missing him and the reminders of him within her current love interest.   During this session, this clinician used the following therapeutic modalities: Client-centered Therapy, Cognitive Behavioral Therapy, Solution-Focused Therapy, and Supportive Psychotherapy    Substance Abuse was not addressed during this session. If the client is diagnosed with a co-occurring substance use disorder, please indicate any changes in the frequency or amount of use: NA. Stage of change for addressing substance use diagnoses: No substance use/Not applicable    ASSESSMENT:  Skye Smith presents with a Euthymic/ normal mood.     her affect is Normal range and intensity, which is congruent, with her mood and the content of the session. The client has made progress on their goals.     Skye Smith presents with a none risk of suicide, none risk of self-harm, and none risk of harm to others.    For any risk assessment that surpasses a \"low\" rating, a safety plan must be developed.    A safety plan was indicated: no  If yes, describe in detail NA    PLAN: Between sessions, Skye Smith will continue to explore her own preferences. At the next session, the therapist will use Client-centered Therapy to address ongoing stressors.    Behavioral Health Treatment Plan " and Discharge Planning: Skye Smith is aware of and agrees to continue to work on their treatment plan. They have identified and are working toward their discharge goals. yes    Depression Follow-up Plan Completed: Not applicable    Visit start and stop times:    12/31/24     Virtual Regular Visit    Verification of patient location:    Patient is located at Home in the following state in which I hold an active license [place]      Assessment/Plan:    Problem List Items Addressed This Visit    None      Goals addressed in session: Goal 1     Depression Follow-up Plan Completed: Not applicable    Reason for visit is No chief complaint on file.       Encounter provider Aerial MisaelWYATT wallisW      Recent Visits  No visits were found meeting these conditions.  Showing recent visits within past 7 days and meeting all other requirements  Future Appointments  No visits were found meeting these conditions.  Showing future appointments within next 150 days and meeting all other requirements       The patient was identified by name and date of birth. Skye Smith was informed that this is a telemedicine visit and that the visit is being conducted throughthe Epic Embedded platform. She agrees to proceed..  My office door was closed. No one else was in the room.  She acknowledged consent and understanding of privacy and security of the video platform. The patient has agreed to participate and understands they can discontinue the visit at any time.    Patient is aware this is a billable service.     Subjective  Skye Smith is a 44 y.o. female  .      HPI     No past medical history on file.    No past surgical history on file.    No current outpatient medications on file.     No current facility-administered medications for this visit.        Not on File    Review of Systems    Video Exam    There were no vitals filed for this visit.    Physical Exam

## 2025-01-07 ENCOUNTER — TELEMEDICINE (OUTPATIENT)
Dept: PSYCHIATRY | Facility: CLINIC | Age: 45
End: 2025-01-07
Payer: COMMERCIAL

## 2025-01-07 DIAGNOSIS — F41.1 GENERALIZED ANXIETY DISORDER: ICD-10-CM

## 2025-01-07 DIAGNOSIS — F43.10 PTSD (POST-TRAUMATIC STRESS DISORDER): Primary | ICD-10-CM

## 2025-01-07 PROCEDURE — 90837 PSYTX W PT 60 MINUTES: CPT | Performed by: SOCIAL WORKER

## 2025-01-07 NOTE — PSYCH
"Behavioral Health Psychotherapy Progress Note    Psychotherapy Provided: Individual Psychotherapy     No diagnosis found.    Goals addressed in session: Goal 1     DATA: Clinician explored client's current stressors-- discussed her returning to her relationship with her partner and identified concerns regarding her fear, but affirming her efforts in being in the moment, respecting her current emotions.   During this session, this clinician used the following therapeutic modalities: Client-centered Therapy, Cognitive Behavioral Therapy, Solution-Focused Therapy, and Supportive Psychotherapy    Substance Abuse was not addressed during this session. If the client is diagnosed with a co-occurring substance use disorder, please indicate any changes in the frequency or amount of use: NA. Stage of change for addressing substance use diagnoses: No substance use/Not applicable    ASSESSMENT:  Skye Smith presents with a Euthymic/ normal mood.     her affect is Normal range and intensity, which is congruent, with her mood and the content of the session. The client has made progress on their goals.     Skye Smith presents with a none risk of suicide, none risk of self-harm, and none risk of harm to others.    For any risk assessment that surpasses a \"low\" rating, a safety plan must be developed.    A safety plan was indicated: no  If yes, describe in detail NA    PLAN: Between sessions, Skye Smith will continue to practice mindfulness. At the next session, the therapist will use Client-centered Therapy to address ongoing stressors.    Behavioral Health Treatment Plan and Discharge Planning: Skye Smith is aware of and agrees to continue to work on their treatment plan. They have identified and are working toward their discharge goals. no    Depression Follow-up Plan Completed: Not applicable    Visit start and stop times:    01/07/25  Start Time: 1308  Stop Time: 1406  Total Visit Time: 58 minutes  "

## 2025-01-14 ENCOUNTER — TELEPHONE (OUTPATIENT)
Dept: PSYCHIATRY | Facility: CLINIC | Age: 45
End: 2025-01-14

## 2025-01-14 NOTE — TELEPHONE ENCOUNTER
Left detailed message for patient to cx 1/14 appt with Aerial Misael. Provider is not in the office. Notified of F/U. Left TA number.

## 2025-01-21 ENCOUNTER — TELEMEDICINE (OUTPATIENT)
Dept: PSYCHIATRY | Facility: CLINIC | Age: 45
End: 2025-01-21
Payer: COMMERCIAL

## 2025-01-21 DIAGNOSIS — F41.1 GENERALIZED ANXIETY DISORDER: ICD-10-CM

## 2025-01-21 DIAGNOSIS — F43.10 PTSD (POST-TRAUMATIC STRESS DISORDER): Primary | ICD-10-CM

## 2025-01-21 PROCEDURE — 90837 PSYTX W PT 60 MINUTES: CPT | Performed by: SOCIAL WORKER

## 2025-01-21 NOTE — PSYCH
"Behavioral Health Psychotherapy Progress Note    Psychotherapy Provided: Individual Psychotherapy     1. PTSD (post-traumatic stress disorder)        2. Generalized anxiety disorder            Goals addressed in session: Goal 1     DATA: Clinician explored client's current stressors-- discussed her worries regarding her daughter's procedure-- but her trust and nish that it will be fine. Clinician validated client's concerns and needs-- that she is wanting time for herself and a break from karate.   During this session, this clinician used the following therapeutic modalities: Client-centered Therapy    Substance Abuse was not addressed during this session. If the client is diagnosed with a co-occurring substance use disorder, please indicate any changes in the frequency or amount of use: NA. Stage of change for addressing substance use diagnoses: No substance use/Not applicable    ASSESSMENT:  Skye Smith presents with a Euthymic/ normal mood.     her affect is Normal range and intensity, which is congruent, with her mood and the content of the session. The client has made progress on their goals.     Skye Smith presents with a none risk of suicide, none risk of self-harm, and none risk of harm to others.    For any risk assessment that surpasses a \"low\" rating, a safety plan must be developed.    A safety plan was indicated: no  If yes, describe in detail NA    PLAN: Between sessions, Skye Smith will continue to utilize her coping skills. At the next session, the therapist will use Client-centered Therapy to address ongoing stressors.    Behavioral Health Treatment Plan and Discharge Planning: Skye Smith is aware of and agrees to continue to work on their treatment plan. They have identified and are working toward their discharge goals. no    Depression Follow-up Plan Completed: Not applicable    Visit start and stop times:    01/21/25  Start Time: 1305  Stop Time: 1400  Total Visit Time: 55 minutes  "

## 2025-01-28 ENCOUNTER — TELEMEDICINE (OUTPATIENT)
Dept: PSYCHIATRY | Facility: CLINIC | Age: 45
End: 2025-01-28
Payer: COMMERCIAL

## 2025-01-28 DIAGNOSIS — F43.10 PTSD (POST-TRAUMATIC STRESS DISORDER): Primary | ICD-10-CM

## 2025-01-28 DIAGNOSIS — F41.1 GENERALIZED ANXIETY DISORDER: ICD-10-CM

## 2025-01-28 PROCEDURE — 90837 PSYTX W PT 60 MINUTES: CPT | Performed by: SOCIAL WORKER

## 2025-01-28 NOTE — PSYCH
"Behavioral Health Psychotherapy Progress Note    Psychotherapy Provided: Individual Psychotherapy     No diagnosis found.    Goals addressed in session: Goal 1     DATA: Clinician explored clients current stressors-- acknowledged her concerns of being indecisive in the same place. Clinician encouraged client to identify the growth I her thought prices and encouraged her to grow her relationship with the Lord, to feel more stable within  her relationships. I'm stuck back in the same place..   During this session, this clinician used the following therapeutic modalities: Client-centered Therapy, Cognitive Behavioral Therapy, and Supportive Psychotherapy    Substance Abuse was not addressed during this session. If the client is diagnosed with a co-occurring substance use disorder, please indicate any changes in the frequency or amount of use: NA. Stage of change for addressing substance use diagnoses: No substance use/Not applicable    ASSESSMENT:  Skye Smith presents with a Euthymic/ normal mood.     her affect is Normal range and intensity, which is congruent, with her mood and the content of the session. The client has made progress on their goals.     Skye Smith presents with a none risk of suicide, none risk of self-harm, and none risk of harm to others.    For any risk assessment that surpasses a \"low\" rating, a safety plan must be developed.    A safety plan was indicated: no  If yes, describe in detail NA    PLAN: Between sessions, Skye Smith will continue to read her bible, and to pray. . At the next session, the therapist will use Client-centered Therapy to address ongoing stressors.    Behavioral Health Treatment Plan and Discharge Planning: Skye Smith is aware of and agrees to continue to work on their treatment plan. They have identified and are working toward their discharge goals. yes    Depression Follow-up Plan Completed: Not applicable    Visit start and stop times:    01/28/25  Start Time: " 0914  Stop Time: 4869  Total Visit Time: 61 minutes

## 2025-02-04 ENCOUNTER — TELEMEDICINE (OUTPATIENT)
Dept: PSYCHIATRY | Facility: CLINIC | Age: 45
End: 2025-02-04
Payer: COMMERCIAL

## 2025-02-04 DIAGNOSIS — F43.10 PTSD (POST-TRAUMATIC STRESS DISORDER): Primary | ICD-10-CM

## 2025-02-04 DIAGNOSIS — F41.1 GENERALIZED ANXIETY DISORDER: ICD-10-CM

## 2025-02-04 PROCEDURE — 90837 PSYTX W PT 60 MINUTES: CPT | Performed by: SOCIAL WORKER

## 2025-02-04 NOTE — PSYCH
"Behavioral Health Psychotherapy Progress Note    Psychotherapy Provided: Individual Psychotherapy     1. PTSD (post-traumatic stress disorder)        2. Generalized anxiety disorder            Goals addressed in session: Goal 1     DATA: Clinician explored client's current stressors-- discussed her taking care of her basic needs but continuing to struggle with social isolation now that she has discontinued karate. Clinician validated client's emotional responses and encouraged her to practice mindfulness, utilize her partner as a resource, and to be sure to continue with her self care by putting boundaries on her work.           During this session, this clinician used the following therapeutic modalities: Client-centered Therapy, Cognitive Behavioral Therapy, Solution-Focused Therapy, and Supportive Psychotherapy    Substance Abuse was not addressed during this session. If the client is diagnosed with a co-occurring substance use disorder, please indicate any changes in the frequency or amount of use: NA. Stage of change for addressing substance use diagnoses: No substance use/Not applicable    ASSESSMENT:  Skye Smith presents with a Euthymic/ normal mood.     her affect is Normal range and intensity, which is congruent, with her mood and the content of the session. The client has made progress on their goals.   Skye Smith presents with a none risk of suicide, none risk of self-harm, and none risk of harm to others.    For any risk assessment that surpasses a \"low\" rating, a safety plan must be developed.    A safety plan was indicated: no  If yes, describe in detail NA    PLAN: Between sessions, Skye Smith will continue to utilize her coping skills. At the next session, the therapist will use Client-centered Therapy to address ongoing stressors.    Behavioral Health Treatment Plan and Discharge Planning: Skye Smith is aware of and agrees to continue to work on their treatment plan. They have identified and " are working toward their discharge goals. yes    Depression Follow-up Plan Completed: Not applicable    Visit start and stop times:    02/04/25  Start Time: 1303  Stop Time: 1410  Total Visit Time: 67 minutes

## 2025-02-25 ENCOUNTER — TELEMEDICINE (OUTPATIENT)
Dept: PSYCHIATRY | Facility: CLINIC | Age: 45
End: 2025-02-25
Payer: COMMERCIAL

## 2025-02-25 DIAGNOSIS — F43.10 PTSD (POST-TRAUMATIC STRESS DISORDER): Primary | ICD-10-CM

## 2025-02-25 DIAGNOSIS — F41.1 GENERALIZED ANXIETY DISORDER: ICD-10-CM

## 2025-02-25 PROCEDURE — 90837 PSYTX W PT 60 MINUTES: CPT | Performed by: SOCIAL WORKER

## 2025-02-25 NOTE — PSYCH
"Behavioral Health Psychotherapy Progress Note    Psychotherapy Provided: Individual Psychotherapy     No diagnosis found.    Goals addressed in session: Goal 1     DATA: Clinician explored client's current stressors-- discussed her anxiety regarding her relationship. Clinician and client discussed her expectations with another break up-- and identifying that she will be okay even with herpartner being hurt. Client is processing because it will be sad and I will feel bad, it does not mean I am making the wrong decision. Clinicain encourage client to remember what ot do during her trauma responses. During this session, this clinician used the following therapeutic modalities: Client-centered Therapy, Cognitive Behavioral Therapy, Solution-Focused Therapy, and Supportive Psychotherapy    Substance Abuse was not addressed during this session. If the client is diagnosed with a co-occurring substance use disorder, please indicate any changes in the frequency or amount of use: NA. Stage of change for addressing substance use diagnoses: No substance use/Not applicable    ASSESSMENT:  Skye Smith presents with a Euthymic/ normal and Anxious mood.     her affect is Normal range and intensity and Tearful, which is congruent, with her mood and the content of the session. The client has made progress on their goals.     Skye Smith presents with a none risk of suicide, none risk of self-harm, and none risk of harm to others.    For any risk assessment that surpasses a \"low\" rating, a safety plan must be developed.    A safety plan was indicated: no  If yes, describe in detail NA    PLAN: Between sessions, Skye Smith will continue to utilize her coping skills. At the next session, the therapist will use Client-centered Therapy to address ongoing stressors.    Behavioral Health Treatment Plan and Discharge Planning: Skye Smith is aware of and agrees to continue to work on their treatment plan. They have identified and are " working toward their discharge goals. no    Depression Follow-up Plan Completed: Not applicable    Visit start and stop times:    02/25/25  Start Time: 1303  Stop Time: 1400  Total Visit Time: 57 minutes

## 2025-03-11 ENCOUNTER — TELEMEDICINE (OUTPATIENT)
Dept: PSYCHIATRY | Facility: CLINIC | Age: 45
End: 2025-03-11
Payer: COMMERCIAL

## 2025-03-11 DIAGNOSIS — F41.1 GENERALIZED ANXIETY DISORDER: ICD-10-CM

## 2025-03-11 DIAGNOSIS — F43.10 PTSD (POST-TRAUMATIC STRESS DISORDER): Primary | ICD-10-CM

## 2025-03-11 PROCEDURE — 90837 PSYTX W PT 60 MINUTES: CPT | Performed by: SOCIAL WORKER

## 2025-03-11 NOTE — PSYCH
"Behavioral Health Psychotherapy Progress Note    Psychotherapy Provided: Individual Psychotherapy     1. PTSD (post-traumatic stress disorder)        2. Generalized anxiety disorder            Goals addressed in session: Goal 1     DATA: Clinician explored client's current stressors-- discussed her choices regarding her current boyfriend-- encouraged her to take time for herself, allow herself to be sad, but validating her decision and not going bakwards. \  Substance Abuse was not addressed during this session. If the client is diagnosed with a co-occurring substance use disorder, please indicate any changes in the frequency or amount of use: NA. Stage of change for addressing substance use diagnoses: No substance use/Not applicable    ASSESSMENT:  Skye Smith presents with a Euthymic/ normal mood.     her affect is Normal range and intensity, which is congruent, with her mood and the content of the session. The client has made progress on their goals.     Skye Smith presents with a none risk of suicide, none risk of self-harm, and none risk of harm to others.    For any risk assessment that surpasses a \"low\" rating, a safety plan must be developed.    A safety plan was indicated: no  If yes, describe in detail NA    PLAN: Between sessions, Skye Smith will continue to utilize her skills. At the next session, the therapist will use Cognitive Behavioral Therapy to address ongoing stressors.    Behavioral Health Treatment Plan and Discharge Planning: Skye Smith is aware of and agrees to continue to work on their treatment plan. They have identified and are working toward their discharge goals. yes    Depression Follow-up Plan Completed: Not applicable    Visit start and stop times:    03/11/25  Start Time: 1203  Stop Time: 1310  Total Visit Time: 67 minutes  "

## 2025-03-18 ENCOUNTER — TELEMEDICINE (OUTPATIENT)
Dept: PSYCHIATRY | Facility: CLINIC | Age: 45
End: 2025-03-18
Payer: COMMERCIAL

## 2025-03-18 DIAGNOSIS — F41.1 GENERALIZED ANXIETY DISORDER: ICD-10-CM

## 2025-03-18 DIAGNOSIS — F43.10 PTSD (POST-TRAUMATIC STRESS DISORDER): Primary | ICD-10-CM

## 2025-03-18 PROCEDURE — 90837 PSYTX W PT 60 MINUTES: CPT | Performed by: SOCIAL WORKER

## 2025-03-18 NOTE — PSYCH
"Virtual Regular VisitName: Skye Smith      : 1980      MRN: [id number]  Encounter Provider: Ashley Douglas LCSW  Encounter Date: 3/18/2025   Encounter department: [facility]  :  Assessment & Plan  PTSD (post-traumatic stress disorder)         Generalized anxiety disorder             Goals addressed in session: Goal 1     DATA: Clinician explored client's current stressors-- discussed her feelings of depression, confusion, and guilt. Clinician and client discussed the 5 stages of grief-- her losing the relationship, and going to go back and forth between these emotions. Clinician affirmed client's decision making and assisted her in recognizing what she has control over. During this session, this clinician used the following therapeutic modalities: Bereavement Therapy, Client-centered Therapy, and Supportive Psychotherapy    Substance Abuse was not addressed during this session. If the client is diagnosed with a co-occurring substance use disorder, please indicate any changes in the frequency or amount of use: NA. Stage of change for addressing substance use diagnoses: No substance use/Not applicable    ASSESSMENT:  Skye presents with a Euthymic/ normal, Anxious, and Dysthymic mood. Skye's affect is Normal range and intensity and Tearful, which is congruent, with their mood and the content of the session. The client has made progress on their goals as evidenced by client making decisions and trusting her decision maing... doubting and not acting but giving herself space. .    Skye presents with a none risk of suicide, none risk of self-harm, and none risk of harm to others.    For any risk assessment that surpasses a \"low\" rating, a safety plan must be developed.    A safety plan was indicated: no  If yes, describe in detail NA    PLAN: Between sessions, Skye will continue to feel her emotions--identifying her stages of grief. At the next session, the therapist will " use Client-centered Therapy to address ongoing stressors.    Behavioral Health Treatment Plan [facility]: Diagnosis and Treatment Plan explained to Skye, Skye relates understanding diagnosis and is agreeable to Treatment Plan. Yes     Depression Follow-up Plan Completed: Not applicable     Reason for visit is No chief complaint on file.     Recent Visits  Date Type Provider Dept   03/18/25 Telemedicine Ashley Douglas LCSW [facility]   Showing recent visits within past 7 days and meeting all other requirements  Future Appointments  No visits were found meeting these conditions.  Showing future appointments within next 150 days and meeting all other requirements     History of Present Illness     HPI    Past Medical History   No past medical history on file.  No past surgical history on file.  No current outpatient medications  Not on File    Objective   There were no vitals taken for this visit.    Video Exam  Physical Exam     Administrative Statements   Encounter provider Ashley Douglas LCSW    The Patient is located at Home and in the following state in which I hold an active license [place].    The patient was identified by name and date of birth. Skye Smith was informed that this is a telemedicine visit and that the visit is being conducted through the Epic Embedded platform. She agrees to proceed..  My office door was closed. No one else was in the room.  She acknowledged consent and understanding of privacy and security of the video platform. The patient has agreed to participate and understands they can discontinue the visit at any time.      Visit Time  Start Time: 1300  Stop Time: 1400  Total Visit Time: 60 minutes

## 2025-03-19 NOTE — PSYCH
Virtual Regular VisitName: Skye Smith      : 1980      MRN: [id number]  Encounter Provider: Ashley Douglas LCSW  Encounter Date: 3/11/2025   Encounter department: [facility]  :  Assessment & Plan  PTSD (post-traumatic stress disorder)         Generalized anxiety disorder             Administrative Statements   Encounter provider Ashley Douglas LCSW    The Patient is located at Home and in the following state in which I hold an active license [place].    The patient was identified by name and date of birth. Skye Smith was informed that this is a telemedicine visit and that the visit is being conducted through the Epic Embedded platform. She agrees to proceed..  My office door was closed. No one else was in the room.  She acknowledged consent and understanding of privacy and security of the video platform. The patient has agreed to participate and understands they can discontinue the visit at any time.        Visit Time  Start Time: 1203  Stop Time: 1310  Total Visit Time: 67 minutes

## 2025-03-25 ENCOUNTER — TELEMEDICINE (OUTPATIENT)
Dept: PSYCHIATRY | Facility: CLINIC | Age: 45
End: 2025-03-25
Payer: COMMERCIAL

## 2025-03-25 DIAGNOSIS — F41.1 GENERALIZED ANXIETY DISORDER: ICD-10-CM

## 2025-03-25 DIAGNOSIS — F43.10 PTSD (POST-TRAUMATIC STRESS DISORDER): Primary | ICD-10-CM

## 2025-03-25 PROCEDURE — 90837 PSYTX W PT 60 MINUTES: CPT | Performed by: SOCIAL WORKER

## 2025-03-25 NOTE — PSYCH
"Virtual Regular VisitName: Skye Smith      : 1980      MRN: [id number]  Encounter Provider: Ashley Douglas LCSW  Encounter Date: 3/25/2025   Encounter department: [facility]  :  Assessment & Plan  PTSD (post-traumatic stress disorder)         Generalized anxiety disorder             Goals addressed in session: Goal 1     DATA: Clinician explored client's current stressors-- affirmed her efforts in exploring her emotional needs and practicing advocating them without fear. Clinician assisted client in recognizing trends of looking for reassurance from others, and operating off of fear instead of exploring her true feelings. Clinician and client discussed the progress of her standing up for herself and requiring more. During this session, this clinician used the following therapeutic modalities: Client-centered Therapy, Cognitive Behavioral Therapy, Solution-Focused Therapy, and Supportive Psychotherapy    Substance Abuse was not addressed during this session. If the client is diagnosed with a co-occurring substance use disorder, please indicate any changes in the frequency or amount of use: NA. Stage of change for addressing substance use diagnoses: No substance use/Not applicable    ASSESSMENT:  Skye presents with a Euthymic/ normal mood. Skye's affect is Normal range and intensity, which is congruent, with their mood and the content of the session. The client has made progress on their goals as evidenced by advocating for her needs, being direct, exploring her emotions.    Skye presents with a none risk of suicide, none risk of self-harm, and none risk of harm to others.    For any risk assessment that surpasses a \"low\" rating, a safety plan must be developed.    A safety plan was indicated: no  If yes, describe in detail NA    PLAN: Between sessions, Skye will continue to utilize her coping skills. At the next session, the therapist will use Client-centered Therapy " to address ongoing stressors.    Behavioral Health Treatment Plan [facility]: Diagnosis and Treatment Plan explained to Skye, Skye relates understanding diagnosis and is agreeable to Treatment Plan. Yes     Depression Follow-up Plan Completed: Not applicable     Reason for visit is No chief complaint on file.     Recent Visits  Date Type Provider Dept   03/25/25 Telemedicine [facility] Douglas [facility]   Showing recent visits within past 7 days and meeting all other requirements  Future Appointments  No visits were found meeting these conditions.  Showing future appointments within next 150 days and meeting all other requirements     History of Present Illness     HPI    Past Medical History   No past medical history on file.  No past surgical history on file.  No current outpatient medications  Not on File    Objective   There were no vitals taken for this visit.    Video Exam  Physical Exam     Administrative Statements   Encounter provider Ashley Douglas LCSW    The Patient is located at Home and in the following state in which I hold an active license [place].    The patient was identified by name and date of birth. Skye Smith was informed that this is a telemedicine visit and that the visit is being conducted through the Epic Embedded platform. She agrees to proceed..  My office door was closed. No one else was in the room.  She acknowledged consent and understanding of privacy and security of the video platform. The patient has agreed to participate and understands they can discontinue the visit at any time.    Visit Time  Start Time: 1302  Stop Time: 1403  Total Visit Time: 61 minutes

## 2025-04-15 ENCOUNTER — TELEMEDICINE (OUTPATIENT)
Dept: PSYCHIATRY | Facility: CLINIC | Age: 45
End: 2025-04-15
Payer: COMMERCIAL

## 2025-04-15 DIAGNOSIS — F41.1 GENERALIZED ANXIETY DISORDER: ICD-10-CM

## 2025-04-15 DIAGNOSIS — F43.10 PTSD (POST-TRAUMATIC STRESS DISORDER): Primary | ICD-10-CM

## 2025-04-15 PROCEDURE — 90837 PSYTX W PT 60 MINUTES: CPT | Performed by: SOCIAL WORKER

## 2025-04-15 NOTE — PSYCH
"Virtual Regular VisitName: Skye Smith      : 1980      MRN: [id number]  Encounter Provider: sAhley Douglas LCSW  Encounter Date: 4/15/2025   Encounter department: [facility]  :  Assessment & Plan  PTSD (post-traumatic stress disorder)         Generalized anxiety disorder             Goals addressed in session: Goal 1     DATA: Clinician explored client's current stressors-- identfied hope for the future-- feelings of guilt in regard to utilizing her mother as a support and allowing her to know things in regard to her relationship. Clinician encouraged client to keep this happiness for herself and to tell her mother once she feels confident that she can share her happiness with others. During this session, this clinician used the following therapeutic modalities: Client-centered Therapy, Cognitive Behavioral Therapy, Solution-Focused Therapy, and Supportive Psychotherapy    Substance Abuse was not addressed during this session. If the client is diagnosed with a co-occurring substance use disorder, please indicate any changes in the frequency or amount of use: NA. Stage of change for addressing substance use diagnoses: No substance use/Not applicable    ASSESSMENT:  Skye presents with a Euthymic/ normal mood. Skye's affect is Normal range and intensity, which is congruent, with their mood and the content of the session. The client has made progress on their goals as evidenced by client making decisions for herself.    Skye presents with a none risk of suicide, none risk of self-harm, and none risk of harm to others.    For any risk assessment that surpasses a \"low\" rating, a safety plan must be developed.    A safety plan was indicated: no  If yes, describe in detail NA    PLAN: Between sessions, Skye will continue to utilize her decision making. At the next session, the therapist will use Client-centered Therapy to address ongoing stressors.    Behavioral Health " Treatment Plan [facility]: Diagnosis and Treatment Plan explained to Skye Skye relates understanding diagnosis and is agreeable to Treatment Plan. Yes     Depression Follow-up Plan Completed: Not applicable     Reason for visit is No chief complaint on file.     Recent Visits  Date Type Provider Dept   04/15/25 Telemedicine [facility] Douglas [facility]   Showing recent visits within past 7 days and meeting all other requirements  Future Appointments  No visits were found meeting these conditions.  Showing future appointments within next 150 days and meeting all other requirements     History of Present Illness     HPI    Past Medical History   No past medical history on file.  No past surgical history on file.  No current outpatient medications  Not on File    Objective   There were no vitals taken for this visit.    Video Exam  Physical Exam     Administrative Statements   Encounter provider Ashley Douglas LCSW    The Patient is located at Home and in the following state in which I hold an active license [place].    The patient was identified by name and date of birth. Skye Smith was informed that this is a telemedicine visit and that the visit is being conducted through the Epic Embedded platform. She agrees to proceed..  My office door was closed. No one else was in the room.  She acknowledged consent and understanding of privacy and security of the video platform. The patient has agreed to participate and understands they can discontinue the visit at any time.    I have spent a total time of 53 minutes in caring for this patient on the day of the visit/encounter including Counseling / Coordination of care, not including the time spent for establishing the audio/video connection.    Visit Time  Start Time: 1309  Stop Time: 1402  Total Visit Time: 53 minutes

## 2025-04-28 ENCOUNTER — TELEPHONE (OUTPATIENT)
Dept: PSYCHIATRY | Facility: CLINIC | Age: 45
End: 2025-04-28

## 2025-04-28 NOTE — TELEPHONE ENCOUNTER
Left detailed message for patient regarding needing a signature for her virtual consent form. It has been sent twice. If we can not obtain it, we may have to cx her appt. Left TA number for questions or assistance.

## 2025-04-29 ENCOUNTER — TELEPHONE (OUTPATIENT)
Age: 45
End: 2025-04-29

## 2025-04-29 NOTE — TELEPHONE ENCOUNTER
Patient is calling regarding cancelling an appointment.    Date/Time: 4/29 @1pm     Reason: conflicting schedule    Patient was rescheduled: YES [] NO [x]  If yes, when was Patient reschedule for: patient has f/u appt schedule    Patient requesting call back to reschedule: YES [] NO [x]    Provider has been notified.

## 2025-05-13 ENCOUNTER — TELEMEDICINE (OUTPATIENT)
Dept: PSYCHIATRY | Facility: CLINIC | Age: 45
End: 2025-05-13
Payer: COMMERCIAL

## 2025-05-13 DIAGNOSIS — F43.10 PTSD (POST-TRAUMATIC STRESS DISORDER): Primary | ICD-10-CM

## 2025-05-13 DIAGNOSIS — F41.1 GENERALIZED ANXIETY DISORDER: ICD-10-CM

## 2025-05-13 PROCEDURE — 90832 PSYTX W PT 30 MINUTES: CPT | Performed by: SOCIAL WORKER

## 2025-05-13 NOTE — PSYCH
"Virtual Regular VisitName: Skye Smith      : 1980      MRN: [id number]  Encounter Provider: Ashley Douglas LCSW  Encounter Date: 2025   Encounter department: [facility]  :  Assessment & Plan  PTSD (post-traumatic stress disorder)         Generalized anxiety disorder             Goals addressed in session: Goal 1     DATA: Clinician explored client's current stressors-- discussed her relationship troubles regarding their acceleration and future plans. Clinician affirmed client's exploration and encouraged her to discuss the inability to change the past, but the importance of her partner seeking security on his own. Clinician assisted client in understanding the role of a psychiatrist and her rights and means regarding any therapist for her daughter. During this session, this clinician used the following therapeutic modalities: Client-centered Therapy, Cognitive Behavioral Therapy, Solution-Focused Therapy, and Supportive Psychotherapy    Substance Abuse was not addressed during this session. If the client is diagnosed with a co-occurring substance use disorder, please indicate any changes in the frequency or amount of use: NA. Stage of change for addressing substance use diagnoses: No substance use/Not applicable    ASSESSMENT:  Skye presents with a Euthymic/ normal mood. Skye's affect is Normal range and intensity, which is congruent, with their mood and the content of the session. The client has made progress on their goals as evidenced by client continuing to advocate for herself and explore her own preferences. Session ended prematurely due to client having another appt scheduled for her daughter. .    Skye presents with a none risk of suicide, none risk of self-harm, and none risk of harm to others.    For any risk assessment that surpasses a \"low\" rating, a safety plan must be developed.    A safety plan was indicated: no  If yes, describe in detail " NA    PLAN: Between sessions, Skye will continue to utilize her coping skills. At the next session, the therapist will use Client-centered Therapy to address ongoing stressors.    Behavioral Health Treatment Plan [facility]: Diagnosis and Treatment Plan explained to Skye, Skye relates understanding diagnosis and is agreeable to Treatment Plan. Yes     Depression Follow-up Plan Completed: Yes     Reason for visit is No chief complaint on file.     Recent Visits  Date Type Provider Dept   05/13/25 Telemedicine Ashley Douglas LCSW [facility]   Showing recent visits within past 7 days and meeting all other requirements  Future Appointments  No visits were found meeting these conditions.  Showing future appointments within next 150 days and meeting all other requirements     History of Present Illness     HPI    Past Medical History   No past medical history on file.  No past surgical history on file.  No current outpatient medications  Not on File    Objective   There were no vitals taken for this visit.    Video Exam  Physical Exam     Administrative Statements   Encounter provider Ashley Douglas LCSW    The Patient is located at Home and in the following state in which I hold an active license [place].    The patient was identified by name and date of birth. Skye Smith was informed that this is a telemedicine visit and that the visit is being conducted through the Epic Embedded platform. She agrees to proceed..  My office door was closed. No one else was in the room.  She acknowledged consent and understanding of privacy and security of the video platform. The patient has agreed to participate and understands they can discontinue the visit at any time.    I have spent a total time of 30 minutes in caring for this patient on the day of the visit/encounter including Counseling / Coordination of care, not including the time spent for establishing the audio/video connection.    Visit Time  Start Time:  4298  Stop Time: 0795  Total Visit Time: 30 minutes

## 2025-05-23 ENCOUNTER — TELEMEDICINE (OUTPATIENT)
Dept: PSYCHIATRY | Facility: CLINIC | Age: 45
End: 2025-05-23
Payer: COMMERCIAL

## 2025-05-23 DIAGNOSIS — F41.1 GENERALIZED ANXIETY DISORDER: ICD-10-CM

## 2025-05-23 DIAGNOSIS — F43.10 PTSD (POST-TRAUMATIC STRESS DISORDER): Primary | ICD-10-CM

## 2025-05-23 PROCEDURE — 90834 PSYTX W PT 45 MINUTES: CPT | Performed by: SOCIAL WORKER

## 2025-05-23 NOTE — PSYCH
"Virtual Regular VisitName: Skye Smith      : 1980      MRN: [id number]  Encounter Provider: Ashley Douglas LCSW  Encounter Date: 2025   Encounter department: [facility]APY[facility]  :  Assessment & Plan  PTSD (post-traumatic stress disorder)         Generalized anxiety disorder             Goals addressed in session: Goal 1     DATA: Clinician explored client's current stressors-- discussed navigating her daughter through professional services. Clinician and client discussed what it is to be expected from a psychiatrist, encouraged  her to expect an indepth evaluation to include intrusive questions about the past. Clinician and client discussed bipolar disorder, and encouraged client to share her emotions with her daughter so that she can see different ways of how to deal with the emotions of grief and loss. During this session, this clinician used the following therapeutic modalities: Bereavement Therapy, Client-centered Therapy, Solution-Focused Therapy, and Supportive Psychotherapy    Substance Abuse was not addressed during this session. If the client is diagnosed with a co-occurring substance use disorder, please indicate any changes in the frequency or amount of use: NA. Stage of change for addressing substance use diagnoses: No substance use/Not applicable    ASSESSMENT:  Skye presents with a Euthymic/ normal and Anxious mood. Skye's affect is Normal range and intensity and Tearful, which is congruent, with their mood and the content of the session. The client has made progress on their goals as evidenced by client continuing to practice mindfulness and self care.    Skye presents with a none risk of suicide, none risk of self-harm, and none risk of harm to others.    For any risk assessment that surpasses a \"low\" rating, a safety plan must be developed.    A safety plan was indicated: no  If yes, describe in detail NA    PLAN: Between sessions, Skye will continue " to express her emotions healthily with her daughter. At the next session, the therapist will use Client-centered Therapy to address ongoing stressors.    Behavioral Health Treatment Plan [facility]: Diagnosis and Treatment Plan explained to Skye, Skye relates understanding diagnosis and is agreeable to Treatment Plan. Yes     Depression Follow-up Plan Completed: Not applicable     Reason for visit is No chief complaint on file.     Recent Visits  Date Type Provider Dept   05/23/25 Telemedicine Aerial ÁNGEL Douglas [facility]   Showing recent visits within past 7 days and meeting all other requirements  Future Appointments  No visits were found meeting these conditions.  Showing future appointments within next 150 days and meeting all other requirements     History of Present Illness     HPI    Past Medical History   Past Medical History[1]  Past Surgical History[2]  No current outpatient medications  Allergies[3]    Objective   There were no vitals taken for this visit.    Video Exam  Physical Exam     Administrative Statements   Encounter provider Ashley Douglas LCSW    The Patient is located at Home and in the following state in which I hold an [place] license [place].    The patient was identified by name and date of birth. Skye Smith was informed that this is a telemedicine visit and that the visit is being conducted through the Epic Embedded platform. She agrees to proceed..  My office door was closed. No one else was in the room.  She acknowledged consent and understanding of privacy and security of the video platform. The patient has agreed to participate and understands they can discontinue the visit at any time.    I have spent a total time of 48 minutes in caring for this patient on the day of the visit/encounter including Counseling / Coordination of care, not including the time spent for establishing the audio/video connection.    Visit Time  Start Time: 0809  Stop Time: 0857  Total Visit  Time: 48 minutes         [1] No past medical history on file.  [2] No past surgical history on file.  [3] Not on File

## 2025-05-23 NOTE — BH TREATMENT PLAN
Outpatient Behavioral Health Psychotherapy Treatment Plan    Skye Smith  1980     Date of Initial Psychotherapy Assessment: 12/28/22   Date of Current Treatment Plan: 05/23/25  Treatment Plan Target Date: 6/03/25  Treatment Plan Expiration Date: 12/3/24    Diagnosis:   No diagnosis found.          Area(s) of Need: I dont want to live in a state of anxiety.. (6/3/24): things still trigger me, they bring back the feelings of anger and resentment, but I have way more peace day to day. It is specific situation I can pin point, versus constantly feeling anxious. UPDATE 12/3/24--less anger and resentment; less new situations that are causing anxiety.. I know in advance when the bad feelings are happening..     I need more direction on going forward.. I want to look for ways to increase my self confidence.. and trust myself into decision making, understand better-- as far as why I feel the way that I do in certain situations. I dont want to be stuck in a thought loop that prevents me from making decisions.. UPDATE 12/3/24-- some of this is time.. the more you have to handle this, the more confident you feel.. I still experience the thought loops..     Thinking about the future.. what do I want for my long term future.. time goes so fast.. the kids are growing up.. Panfilo is in high school; Cheryl is in College.. how can I prepare to the next phase of life.. Setting goals for myself..something tangible.. in 2 years I want to do, feel, or be.. mom, dad, and brother come live with us..     I get anxious about the kids appointments.... scheduling, visiting... I know that Cheryl talked to you.. bad experience at the psychiatrist...     You shouls have gone there to be diagnosed.. he should have been welcoming... letting you know what to expect... they should spend a little more itme.. should have more time.... she... here is what it looks like... maybe that's on me...the pediatrician not to do the medication management..  everything that goes on with the insurance.. she didn't want to waste her apts on med checks..     Long Term Goal 1 (in the client's own words): I want to make peace with the situation for what it is now...     Stage of Change: Action    Target Date for completion: 6/26/24     Anticipated therapeutic modalities: berevement; compassion focused; trauma informed; strengths based     People identified to complete this goal: Client and clinician      Objective 1: (identify the means of measuring success in meeting the objective): Client will go through the 5 stages of grief regarding death of exdesireesband-- identifying these and processing each stage;6/3/24: when I think about a future with someone else.. this is when I feel the grief.. client will allow herself to feel her feelings 5/5x; Client will practice forgiveness for herself--identifying at least 3 things she was held accountable for within her marriage-- and identifying reality 5/5x; 6/3/24-- trying hard not to repeat decisions that I have made.. I am being more direct in real time..trying to make things go away, this is never going to happen again.. I still experience the guilt when my kids express similar behaviors to your ex .  UPDATE 12/3/24-- time and consistency help with these things.. the more time that goes on the less behaviors I see.. I'm setting an example of the different type of behavior..       Objective 2: (identify the means of measuring success in meeting the objective): NA      Long Term Goal 2 (in the client's own words): I want to increase my self confidence    Stage of Change: Action    Target Date for completion: 6/26/24     Anticipated therapeutic modalities: somatic, mindfulness, breathwork, CBT, strengths based, exposure     People identified to complete this goal: Client and clinician      Objective 1: (identify the means of measuring success in meeting the objective):Client will practice positive self talk and will identify at  "least 3 personal strengths--will acknowledge the attributes that make her a \"good person\" client will practice mindfulness as she utilizes her strengths at least once a day. 6/3/24 there are certain areas I'm still not feeling confident in-- I feel calm, confident in my ability to run the house.. Client will practice her coping mechanisms 5/5x; client will continue to recognize avoidance and will continue to expose herself to anxiety inducing situations at least once a week; 12/3/24 UPDATE: ITS TIME AND CONSISTENCY.. WE CAN HANDLE THIS. Client  will practice placing her needs as a priority at least once a week; Client will practice making decisions independently at least once a week; Client will practice at least 3 skills to make decisions; client will practice validating her decisions following consequences 5/5x.       Objective 2: (identify the means of measuring success in meeting the objective): NA     Long Term Goal 3 (in the client's own words): NA    Stage of Change: Pre-contemplation    Target Date for completion: NA     Anticipated therapeutic modalities: NA     People identified to complete this goal: NA      Objective 1: (identify the means of measuring success in meeting the objective): NA      Objective 2: (identify the means of measuring success in meeting the objective): NA     I am currently under the care of a [facility] psychiatric provider: no    My [facility] psychiatric provider is: NA    I am currently taking psychiatric medications: No    I feel that I will be ready for discharge from mental health care when I reach the following (measurable goal/objective): When I dont have panic attacks in at least 90 consecutive days    For children and adults who have a legal guardian:   Has there been any change to custody orders and/or guardianship status? NA. If yes, attach updated documentation.    I have updated my Crisis Plan and have been offered a copy of this plan    Behavioral Health Treatment " Plan [facility]: Diagnosis and Treatment Plan explained to Skye Smith acknowledges an understanding of their diagnosis. Skye Smith agrees to this treatment plan.    I have been offered a copy of this Treatment Plan. yes

## 2025-06-02 ENCOUNTER — TELEMEDICINE (OUTPATIENT)
Dept: PSYCHIATRY | Facility: CLINIC | Age: 45
End: 2025-06-02
Payer: COMMERCIAL

## 2025-06-02 DIAGNOSIS — F41.1 GENERALIZED ANXIETY DISORDER: Primary | ICD-10-CM

## 2025-06-02 PROCEDURE — 90834 PSYTX W PT 45 MINUTES: CPT | Performed by: SOCIAL WORKER

## 2025-06-05 NOTE — PSYCH
"Virtual Regular VisitName: Skye Smith      : 1980      MRN: [id number]  Encounter Provider: Ashley Douglas LCSW  Encounter Date: 2025   Encounter department: [facility]  :  Assessment & Plan  Generalized anxiety disorder             Goals addressed in session: Goal 1     DATA: Clinician explored client's current stressors-- discussed the progress made within her relationship and encouraged client to identify her intentions when addressing situations with her daughter. Clinician and client problem solved, discussed the progress with her daughter.   During this session, this clinician used the following therapeutic modalities: Bereavement Therapy, Client-centered Therapy, Cognitive Behavioral Therapy, Solution-Focused Therapy, and Supportive Psychotherapy    Substance Abuse was not addressed during this session. If the client is diagnosed with a co-occurring substance use disorder, please indicate any changes in the frequency or amount of use: NA. Stage of change for addressing substance use diagnoses: No substance use/Not applicable    ASSESSMENT:  Skye presents with a Euthymic/ normal mood. Skye's affect is Normal range and intensity, which is congruent, with their mood and the content of the session. The client has made progress on their goals as evidenced by client continuing to communicate effectively with her daughter.    Skye presents with a none risk of suicide, none risk of self-harm, and none risk of harm to others.    For any risk assessment that surpasses a \"low\" rating, a safety plan must be developed.    A safety plan was indicated: no  If yes, describe in detail NA    PLAN: Between sessions, Skye will continue to utilize her effective communication. At the next session, the therapist will use Client-centered Therapy to address ongoing stressors.    Behavioral Health Treatment Plan [facility]: Diagnosis and Treatment Plan explained to Skye, Skye relates " understanding diagnosis and is agreeable to Treatment Plan. Yes     Depression Follow-up Plan Completed: Not applicable     Reason for visit is No chief complaint on file.     Recent Visits  Date Type Provider Dept   06/02/25 Telemedicine Aerial ÁNGEL Douglas [facility]   Showing recent visits within past 7 days and meeting all other requirements  Future Appointments  No visits were found meeting these conditions.  Showing future appointments within next 150 days and meeting all other requirements     History of Present Illness     HPI    Past Medical History   Past Medical History[1]  Past Surgical History[2]  No current outpatient medications  Allergies[3]    Objective   There were no vitals taken for this visit.    Video Exam  Physical Exam     Administrative Statements   Encounter provider Aerial ÁNGEL Douglas    The Patient is located at Home and in the following state in which I hold an active license [place].    The patient was identified by name and date of birth. Skye Smith was informed that this is a telemedicine visit and that the visit is being conducted through the Epic Embedded platform. She agrees to proceed..  My office door was closed. No one else was in the room.  She acknowledged consent and understanding of privacy and security of the video platform. The patient has agreed to participate and understands they can discontinue the visit at any time.    I have spent a total time of 44 minutes in caring for this patient on the day of the visit/encounter including Counseling / Coordination of care, not including the time spent for establishing the audio/video connection.    Visit Time  Start Time: 1308  Stop Time: 1352  Total Visit Time: 44 minutes       [1] No past medical history on file.  [2] No past surgical history on file.  [3] Not on File

## 2025-06-16 ENCOUNTER — TELEPHONE (OUTPATIENT)
Age: 45
End: 2025-06-16

## 2025-06-24 ENCOUNTER — TELEMEDICINE (OUTPATIENT)
Dept: PSYCHIATRY | Facility: CLINIC | Age: 45
End: 2025-06-24
Payer: COMMERCIAL

## 2025-06-24 DIAGNOSIS — F41.1 GENERALIZED ANXIETY DISORDER: Primary | ICD-10-CM

## 2025-06-24 PROCEDURE — 90837 PSYTX W PT 60 MINUTES: CPT | Performed by: SOCIAL WORKER

## 2025-06-24 NOTE — PSYCH
"Virtual Regular VisitName: Skye Smith      : 1980      MRN: [id number]  Encounter Provider: Ashley Douglas LCSW  Encounter Date: 2025   Encounter department: [facility]  :  Assessment & Plan  Generalized anxiety disorder             Goals addressed in session: Goal 1     DATA: Clinician explored client's current stressors-- discussed her concerns for her parents getting older and what hey may need from her as life continues. Clinician assisted client in recognizing what will inevitably happen, what she is able to do, what she is trying to prevent, and her natural limitations. Clinician encouraged client to remind herself of what is to come, and that she is ultimately not in control. During this session, this clinician used the following therapeutic modalities: Client-centered Therapy, Cognitive Behavioral Therapy, Solution-Focused Therapy, and Supportive Psychotherapy    Substance Abuse was not addressed during this session. If the client is diagnosed with a co-occurring substance use disorder, please indicate any changes in the frequency or amount of use: NA. Stage of change for addressing substance use diagnoses: No substance use/Not applicable    ASSESSMENT:  Skye presents with a Euthymic/ normal mood. Skye's affect is Normal range and intensity, which is congruent, with their mood and the content of the session. The client has made progress on their goals as evidenced by client's awareness of her anxiety .    Skye presents with a none risk of suicide, none risk of self-harm, and none risk of harm to others.    For any risk assessment that surpasses a \"low\" rating, a safety plan must be developed.    A safety plan was indicated: no  If yes, describe in detail NA    PLAN: Between sessions, Skye will continue to utilize her coping skills. At the next session, the therapist will use Client-centered Therapy to address ongoing stressors.    Behavioral Health " Treatment Plan [facility]: Diagnosis and Treatment Plan explained to Skye Skye relates understanding diagnosis and is agreeable to Treatment Plan. Yes     Depression Follow-up Plan Completed: Not applicable     Reason for visit is No chief complaint on file.     Recent Visits  Date Type Provider Dept   06/24/25 Telemedicine Aerial ÁNGEL Douglas [facility]   Showing recent visits within past 7 days and meeting all other requirements  Future Appointments  No visits were found meeting these conditions.  Showing future appointments within next 150 days and meeting all other requirements     History of Present Illness     HPI    Past Medical History   Past Medical History[1]  Past Surgical History[2]  No current outpatient medications  Allergies[3]    Objective   There were no vitals taken for this visit.    Video Exam  Physical Exam     Administrative Statements   Encounter provider Ashley Douglas LCSW    The Patient is located at Home and in the following state in which I [place] an [place] license [place].    The patient was identified by name and date of birth. Skye Smith was informed that this is a telemedicine visit and that the visit is being conducted through the Epic Embedded platform. She agrees to proceed..  My office door was closed. No one else was in the room.  She acknowledged consent and understanding of privacy and security of the video platform. The patient has agreed to participate and understands they can discontinue the visit at any time.    I have spent a total time of 57 minutes in caring for this patient on the day of the visit/encounter including Counseling / Coordination of care, not including the time spent for establishing the audio/video connection.    Visit Time  Start Time: 1307  Stop Time: 1404  Total Visit Time: 57 minutes         [1] No past medical history on file.  [2] No past surgical history on file.  [3] Not on File

## 2025-07-09 ENCOUNTER — TELEPHONE (OUTPATIENT)
Dept: PSYCHIATRY | Facility: CLINIC | Age: 45
End: 2025-07-09

## 2025-07-22 ENCOUNTER — TELEMEDICINE (OUTPATIENT)
Dept: PSYCHIATRY | Facility: CLINIC | Age: 45
End: 2025-07-22
Payer: COMMERCIAL

## 2025-07-22 DIAGNOSIS — F41.1 GENERALIZED ANXIETY DISORDER: Primary | ICD-10-CM

## 2025-07-22 PROCEDURE — 90837 PSYTX W PT 60 MINUTES: CPT | Performed by: SOCIAL WORKER

## 2025-07-24 NOTE — PSYCH
"Virtual Regular VisitName: Skye Smith      : 1980      MRN: [id number]  Encounter Provider: Ashley Douglas LCSW  Encounter Date: 2025   Encounter department: [facility]  :  Assessment & Plan  Generalized anxiety disorder             Goals addressed in session: Goal 1     DATA: Clinician explored client's current stressors-- discussed the progress she has made as a mother, making decisions for herself and her family and utilizing her support system, but making her own decisions and trusting herself. Clinician assisted client in recognizing her tendency to think of the future-- creating issues that do not currently exist, not trusting her own experiences with these illnesses-- assisted client in practicing her grounding strategies and refocusing to today's problems. Clinician encouraged client to practice mindfulness, and to wear her heart on her sleeve so that her children can see how she moo and processes her own emotions. During this session, this clinician used the following therapeutic modalities: Client-centered Therapy, Cognitive Behavioral Therapy, Solution-Focused Therapy, and Supportive Psychotherapy    Substance Abuse was not addressed during this session. If the client is diagnosed with a co-occurring substance use disorder, please indicate any changes in the frequency or amount of use: NA. Stage of change for addressing substance use diagnoses: No substance use/Not applicable    ASSESSMENT:  Skye presents with a Euthymic/ normal mood. Skye's affect is Normal range and intensity, which is congruent, with their mood and the content of the session. The client has made progress on their goals as evidenced by client continuing to practice utilizing her coping skills.    Skye presents with a none risk of suicide, none risk of self-harm, and none risk of harm to others.    For any risk assessment that surpasses a \"low\" rating, a safety plan must be " developed.    A safety plan was indicated: no  If yes, describe in detail NA    PLAN: Between sessions, Skye will continue to utilize her coping skills. At the next session, the therapist will use Client-centered Therapy to address ongoing stressors.    Behavioral Health Treatment Plan [facility]: Diagnosis and Treatment Plan explained to Skye, Skye relates understanding diagnosis and is agreeable to Treatment Plan. Yes     Depression Follow-up Plan Completed: Not applicable     Reason for visit is   Chief Complaint   Patient presents with    Virtual Regular Visit      Recent Visits  Date Type Provider Dept   07/22/25 Telemedicine [facility]   Showing recent visits within past 7 days and meeting all other requirements  Future Appointments  No visits were found meeting these conditions.  Showing future appointments within next 150 days and meeting all other requirements     History of Present Illness     HPI    Past Medical History   Past Medical History[1]  Past Surgical History[2]  No current outpatient medications  Allergies[3]    Objective   There were no vitals taken for this visit.    Video Exam  Physical Exam     Administrative Statements   Encounter provider Ashley Douglas LCSW    The Patient is located at Home and in the following state in which I hold an active license [place].    The patient was identified by name and date of birth. Skye Smith was informed that this is a telemedicine visit and that the visit is being conducted through the Epic Embedded platform. She agrees to proceed..  My office door was closed. No one else was in the room.  She acknowledged consent and understanding of privacy and security of the video platform. The patient has agreed to participate and understands they can discontinue the visit at any time.    I have spent a total time of 58 minutes in caring for this patient on the day of the visit/encounter including Counseling / Coordination of  care, not including the time spent for establishing the audio/video connection.    Visit Time  Start Time: 1303  Stop Time: 1401  Total Visit Time: 58 minutes           [1] No past medical history on file.  [2] No past surgical history on file.  [3] Not on File

## 2025-08-05 ENCOUNTER — TELEMEDICINE (OUTPATIENT)
Dept: PSYCHIATRY | Facility: CLINIC | Age: 45
End: 2025-08-05
Payer: COMMERCIAL

## 2025-08-05 DIAGNOSIS — F41.1 GENERALIZED ANXIETY DISORDER: Primary | ICD-10-CM

## 2025-08-05 PROCEDURE — 90837 PSYTX W PT 60 MINUTES: CPT | Performed by: SOCIAL WORKER

## 2025-08-19 ENCOUNTER — TELEPHONE (OUTPATIENT)
Age: 45
End: 2025-08-19